# Patient Record
Sex: FEMALE | Race: WHITE | Employment: OTHER | URBAN - METROPOLITAN AREA
[De-identification: names, ages, dates, MRNs, and addresses within clinical notes are randomized per-mention and may not be internally consistent; named-entity substitution may affect disease eponyms.]

---

## 2017-10-25 ENCOUNTER — HOSPITAL ENCOUNTER (EMERGENCY)
Age: 67
Discharge: HOME | End: 2017-10-25
Attending: EMERGENCY MEDICINE
Payer: COMMERCIAL

## 2017-10-25 ENCOUNTER — APPOINTMENT (OUTPATIENT)
Dept: GENERAL RADIOLOGY | Age: 67
End: 2017-10-25
Attending: PHYSICIAN ASSISTANT
Payer: COMMERCIAL

## 2017-10-25 VITALS
DIASTOLIC BLOOD PRESSURE: 72 MMHG | RESPIRATION RATE: 18 BRPM | HEART RATE: 62 BPM | BODY MASS INDEX: 25.18 KG/M2 | HEIGHT: 69 IN | OXYGEN SATURATION: 100 % | SYSTOLIC BLOOD PRESSURE: 138 MMHG | TEMPERATURE: 98.1 F | WEIGHT: 170 LBS

## 2017-10-25 DIAGNOSIS — R07.89 ATYPICAL CHEST PAIN: Primary | ICD-10-CM

## 2017-10-25 DIAGNOSIS — F41.1 ANXIETY STATE: ICD-10-CM

## 2017-10-25 LAB
ALBUMIN SERPL-MCNC: 3.7 G/DL (ref 3.4–5)
ALBUMIN/GLOB SERPL: 1.1 {RATIO} (ref 0.8–1.7)
ALP SERPL-CCNC: 63 U/L (ref 45–117)
ALT SERPL-CCNC: 26 U/L (ref 13–56)
ANION GAP SERPL CALC-SCNC: 10 MMOL/L (ref 3–18)
APPEARANCE UR: CLEAR
AST SERPL-CCNC: 21 U/L (ref 15–37)
BASOPHILS # BLD: 0 K/UL (ref 0–0.06)
BASOPHILS NFR BLD: 1 % (ref 0–2)
BILIRUB SERPL-MCNC: 0.4 MG/DL (ref 0.2–1)
BILIRUB UR QL: NEGATIVE
BUN SERPL-MCNC: 12 MG/DL (ref 7–18)
BUN/CREAT SERPL: 14 (ref 12–20)
CALCIUM SERPL-MCNC: 9.5 MG/DL (ref 8.5–10.1)
CHLORIDE SERPL-SCNC: 103 MMOL/L (ref 100–108)
CK MB CFR SERPL CALC: 2.1 % (ref 0–4)
CK MB CFR SERPL CALC: 2.1 % (ref 0–4)
CK MB SERPL-MCNC: 2.3 NG/ML (ref 5–25)
CK MB SERPL-MCNC: 2.4 NG/ML (ref 5–25)
CK SERPL-CCNC: 108 U/L (ref 26–192)
CK SERPL-CCNC: 115 U/L (ref 26–192)
CO2 SERPL-SCNC: 28 MMOL/L (ref 21–32)
COLOR UR: YELLOW
CREAT SERPL-MCNC: 0.88 MG/DL (ref 0.6–1.3)
DIFFERENTIAL METHOD BLD: ABNORMAL
EOSINOPHIL # BLD: 0 K/UL (ref 0–0.4)
EOSINOPHIL NFR BLD: 1 % (ref 0–5)
ERYTHROCYTE [DISTWIDTH] IN BLOOD BY AUTOMATED COUNT: 13.7 % (ref 11.6–14.5)
GLOBULIN SER CALC-MCNC: 3.5 G/DL (ref 2–4)
GLUCOSE SERPL-MCNC: 107 MG/DL (ref 74–99)
GLUCOSE UR STRIP.AUTO-MCNC: NEGATIVE MG/DL
HCT VFR BLD AUTO: 37.7 % (ref 35–45)
HGB BLD-MCNC: 12.4 G/DL (ref 12–16)
HGB UR QL STRIP: NEGATIVE
KETONES UR QL STRIP.AUTO: 15 MG/DL
LEUKOCYTE ESTERASE UR QL STRIP.AUTO: NEGATIVE
LIPASE SERPL-CCNC: 116 U/L (ref 73–393)
LYMPHOCYTES # BLD: 0.7 K/UL (ref 0.9–3.6)
LYMPHOCYTES NFR BLD: 15 % (ref 21–52)
MCH RBC QN AUTO: 30.2 PG (ref 24–34)
MCHC RBC AUTO-ENTMCNC: 32.9 G/DL (ref 31–37)
MCV RBC AUTO: 91.7 FL (ref 74–97)
MONOCYTES # BLD: 0.4 K/UL (ref 0.05–1.2)
MONOCYTES NFR BLD: 8 % (ref 3–10)
NEUTS SEG # BLD: 3.7 K/UL (ref 1.8–8)
NEUTS SEG NFR BLD: 75 % (ref 40–73)
NITRITE UR QL STRIP.AUTO: NEGATIVE
PH UR STRIP: 5.5 [PH] (ref 5–8)
PLATELET # BLD AUTO: 231 K/UL (ref 135–420)
PMV BLD AUTO: 10.1 FL (ref 9.2–11.8)
POTASSIUM SERPL-SCNC: 3.7 MMOL/L (ref 3.5–5.5)
PROT SERPL-MCNC: 7.2 G/DL (ref 6.4–8.2)
PROT UR STRIP-MCNC: NEGATIVE MG/DL
RBC # BLD AUTO: 4.11 M/UL (ref 4.2–5.3)
SODIUM SERPL-SCNC: 141 MMOL/L (ref 136–145)
SP GR UR REFRACTOMETRY: 1.01 (ref 1–1.03)
TROPONIN I SERPL-MCNC: <0.02 NG/ML (ref 0–0.06)
TROPONIN I SERPL-MCNC: <0.02 NG/ML (ref 0–0.06)
TSH SERPL DL<=0.05 MIU/L-ACNC: 5.74 UIU/ML (ref 0.36–3.74)
UROBILINOGEN UR QL STRIP.AUTO: 0.2 EU/DL (ref 0.2–1)
WBC # BLD AUTO: 4.8 K/UL (ref 4.6–13.2)

## 2017-10-25 PROCEDURE — 81003 URINALYSIS AUTO W/O SCOPE: CPT | Performed by: EMERGENCY MEDICINE

## 2017-10-25 PROCEDURE — 93005 ELECTROCARDIOGRAM TRACING: CPT

## 2017-10-25 PROCEDURE — 82550 ASSAY OF CK (CPK): CPT | Performed by: PHYSICIAN ASSISTANT

## 2017-10-25 PROCEDURE — 84443 ASSAY THYROID STIM HORMONE: CPT | Performed by: PHYSICIAN ASSISTANT

## 2017-10-25 PROCEDURE — 83690 ASSAY OF LIPASE: CPT | Performed by: PHYSICIAN ASSISTANT

## 2017-10-25 PROCEDURE — 80053 COMPREHEN METABOLIC PANEL: CPT | Performed by: PHYSICIAN ASSISTANT

## 2017-10-25 PROCEDURE — 85025 COMPLETE CBC W/AUTO DIFF WBC: CPT | Performed by: PHYSICIAN ASSISTANT

## 2017-10-25 PROCEDURE — 71010 XR CHEST PORT: CPT

## 2017-10-25 PROCEDURE — 99283 EMERGENCY DEPT VISIT LOW MDM: CPT

## 2017-10-25 NOTE — DISCHARGE INSTRUCTIONS
Anxiety Disorder: Care Instructions  Your Care Instructions    Anxiety is a normal reaction to stress. Difficult situations can cause you to have symptoms such as sweaty palms and a nervous feeling. In an anxiety disorder, the symptoms are far more severe. Constant worry, muscle tension, trouble sleeping, nausea and diarrhea, and other symptoms can make normal daily activities difficult or impossible. These symptoms may occur for no reason, and they can affect your work, school, or social life. Medicines, counseling, and self-care can all help. Follow-up care is a key part of your treatment and safety. Be sure to make and go to all appointments, and call your doctor if you are having problems. It's also a good idea to know your test results and keep a list of the medicines you take. How can you care for yourself at home? · Take medicines exactly as directed. Call your doctor if you think you are having a problem with your medicine. · Go to your counseling sessions and follow-up appointments. · Recognize and accept your anxiety. Then, when you are in a situation that makes you anxious, say to yourself, \"This is not an emergency. I feel uncomfortable, but I am not in danger. I can keep going even if I feel anxious. \"  · Be kind to your body:  ¨ Relieve tension with exercise or a massage. ¨ Get enough rest.  ¨ Avoid alcohol, caffeine, nicotine, and illegal drugs. They can increase your anxiety level and cause sleep problems. ¨ Learn and do relaxation techniques. See below for more about these techniques. · Engage your mind. Get out and do something you enjoy. Go to a funny movie, or take a walk or hike. Plan your day. Having too much or too little to do can make you anxious. · Keep a record of your symptoms. Discuss your fears with a good friend or family member, or join a support group for people with similar problems. Talking to others sometimes relieves stress.   · Get involved in social groups, or volunteer to help others. Being alone sometimes makes things seem worse than they are. · Get at least 30 minutes of exercise on most days of the week to relieve stress. Walking is a good choice. You also may want to do other activities, such as running, swimming, cycling, or playing tennis or team sports. Relaxation techniques  Do relaxation exercises 10 to 20 minutes a day. You can play soothing, relaxing music while you do them, if you wish. · Tell others in your house that you are going to do your relaxation exercises. Ask them not to disturb you. · Find a comfortable place, away from all distractions and noise. · Lie down on your back, or sit with your back straight. · Focus on your breathing. Make it slow and steady. · Breathe in through your nose. Breathe out through either your nose or mouth. · Breathe deeply, filling up the area between your navel and your rib cage. Breathe so that your belly goes up and down. · Do not hold your breath. · Breathe like this for 5 to 10 minutes. Notice the feeling of calmness throughout your whole body. As you continue to breathe slowly and deeply, relax by doing the following for another 5 to 10 minutes:  · Tighten and relax each muscle group in your body. You can begin at your toes and work your way up to your head. · Imagine your muscle groups relaxing and becoming heavy. · Empty your mind of all thoughts. · Let yourself relax more and more deeply. · Become aware of the state of calmness that surrounds you. · When your relaxation time is over, you can bring yourself back to alertness by moving your fingers and toes and then your hands and feet and then stretching and moving your entire body. Sometimes people fall asleep during relaxation, but they usually wake up shortly afterward. · Always give yourself time to return to full alertness before you drive a car or do anything that might cause an accident if you are not fully alert.  Never play a relaxation tape while you drive a car. When should you call for help? Call 911 anytime you think you may need emergency care. For example, call if:  ? · You feel you cannot stop from hurting yourself or someone else. ? Keep the numbers for these national suicide hotlines: 2-385-750-TALK (8-998.250.7235) and 3-387-GENUGHW (1-992.499.1900). If you or someone you know talks about suicide or feeling hopeless, get help right away. ? Watch closely for changes in your health, and be sure to contact your doctor if:  ? · You have anxiety or fear that affects your life. ? · You have symptoms of anxiety that are new or different from those you had before. Where can you learn more? Go to http://roxanna-tierra.info/. Enter P754 in the search box to learn more about \"Anxiety Disorder: Care Instructions. \"  Current as of: May 12, 2017  Content Version: 11.4  © 7177-9578 "[employer]. Care instructions adapted under license by [employer] (which disclaims liability or warranty for this information). If you have questions about a medical condition or this instruction, always ask your healthcare professional. [address] any warranty or liability for your use of this information. Chest Pain: Care Instructions  Your Care Instructions    There are many things that can cause chest pain. Some are not serious and will get better on their own in a few days. But some kinds of chest pain need more testing and treatment. Your doctor may have recommended a follow-up visit in the next 8 to 12 hours. If you are not getting better, you may need more tests or treatment. Even though your doctor has released you, you still need to watch for any problems. The doctor carefully checked you, but sometimes problems can develop later. If you have new symptoms or if your symptoms do not get better, get medical care right away.   If you have worse or different chest pain or pressure that lasts more than 5 minutes or you passed out (lost consciousness), call 911 or seek other emergency help right away. A medical visit is only one step in your treatment. Even if you feel better, you still need to do what your doctor recommends, such as going to all suggested follow-up appointments and taking medicines exactly as directed. This will help you recover and help prevent future problems. How can you care for yourself at home? · Rest until you feel better. · Take your medicine exactly as prescribed. Call your doctor if you think you are having a problem with your medicine. · Do not drive after taking a prescription pain medicine. When should you call for help? Call 911 if:  ? · You passed out (lost consciousness). ? · You have severe difficulty breathing. ? · You have symptoms of a heart attack. These may include:  ¨ Chest pain or pressure, or a strange feeling in your chest.  ¨ Sweating. ¨ Shortness of breath. ¨ Nausea or vomiting. ¨ Pain, pressure, or a strange feeling in your back, neck, jaw, or upper belly or in one or both shoulders or arms. ¨ Lightheadedness or sudden weakness. ¨ A fast or irregular heartbeat. After you call 911, the  may tell you to chew 1 adult-strength or 2 to 4 low-dose aspirin. Wait for an ambulance. Do not try to drive yourself. ?Call your doctor today if:  ? · You have any trouble breathing. ? · Your chest pain gets worse. ? · You are dizzy or lightheaded, or you feel like you may faint. ? · You are not getting better as expected. ? · You are having new or different chest pain. Where can you learn more? Go to http://roxanna-tierra.info/. Enter A120 in the search box to learn more about \"Chest Pain: Care Instructions. \"  Current as of: March 20, 2017  Content Version: 11.4  © 4534-6758 [employer].  Care instructions adapted under license by [employer] (which disclaims liability or warranty for this information). If you have questions about a medical condition or this instruction, always ask your healthcare professional. [place] [address] any warranty or liability for your use of this information.

## 2017-10-25 NOTE — ED PROVIDER NOTES
HPI Comments: 11:23 AM     Messi Aranda is a 78 y.o. female with hx of thyroid CA, HLD, and HTN presenting to the ED via EMS C/O \"a couple minute\" episodes of non-radiating, sternal chest pressure starting yesterday. Last episode was 30 minutes ago while she was writing notes. States she is undergoing increased stress due to legal issues, as well as drug problems in her neighborhood. Pt also reports she is becoming increasingly \"paranoid\", stating that her phone has been cloned and she feels like she is being followed. Medications include Wellbutrin, Mirapex, and Synthroid. Reports cigarette cessation 40 years ago. Denies cardiac history or hx of anxiety. Fhx of father with MI at age 67, mother with HTN, and brother with HTN. No SI or HI. Denies illicit drug use or ETOH. Pt denies SOB, chest pain, and any other symptoms or complaints. Written by ALLYN Aguirre, as dictated by Chichi Nash PA-C     The history is provided by the patient. No  was used. Past Medical History:   Diagnosis Date    CAD (coronary artery disease)     thyroid    Hypertension        No past surgical history on file. No family history on file. Social History     Social History    Marital status:      Spouse name: N/A    Number of children: N/A    Years of education: N/A     Occupational History    Not on file. Social History Main Topics    Smoking status: Never Smoker    Smokeless tobacco: Never Used    Alcohol use 1.2 oz/week     2 Glasses of wine per week      Comment: DAILY    Drug use: Not on file    Sexual activity: Not on file     Other Topics Concern    Not on file     Social History Narrative         ALLERGIES: Sulfa (sulfonamide antibiotics)    Review of Systems   Constitutional: Negative for chills and fever. HENT: Negative for congestion. Respiratory: Negative for cough and shortness of breath.     Cardiovascular: Positive for chest pain (chest pressure, denies chest pain). Gastrointestinal: Negative for nausea and vomiting. Musculoskeletal: Negative for arthralgias and joint swelling. Skin: Negative for rash and wound. Neurological: Negative for dizziness, weakness, light-headedness and headaches. Hematological: Negative for adenopathy. Psychiatric/Behavioral: Negative for confusion, sleep disturbance and suicidal ideas. The patient is nervous/anxious. Stress       Vitals:    10/25/17 1156 10/25/17 1720   BP: (!) 147/117 138/72   Pulse: 63 62   Resp: 17 18   Temp: 97.6 °F (36.4 °C) 98.1 °F (36.7 °C)   SpO2: 100% 100%   Weight: 77.1 kg (170 lb)    Height: 5' 9\" (1.753 m)             Physical Exam   Constitutional: She is oriented to person, place, and time. She appears well-developed and well-nourished. No distress.  female in NAD. Alert. Appears anxious. Organizing papers. HENT:   Head: Normocephalic and atraumatic. Right Ear: External ear normal.   Left Ear: External ear normal.   Nose: Nose normal.   Mouth/Throat: Oropharynx is clear and moist.   Eyes: Conjunctivae are normal. Right eye exhibits no discharge. Left eye exhibits no discharge. Neck: Normal range of motion. Cardiovascular: Normal rate, regular rhythm, normal heart sounds and intact distal pulses. Exam reveals no gallop and no friction rub. No murmur heard. Pulmonary/Chest: Effort normal and breath sounds normal. No accessory muscle usage. No tachypnea. No respiratory distress. She has no decreased breath sounds. She has no wheezes. She has no rhonchi. She has no rales. Abdominal: Soft. Musculoskeletal: Normal range of motion. Neurological: She is alert and oriented to person, place, and time. Skin: Skin is warm and dry. No rash noted. She is not diaphoretic. No erythema. Psychiatric: Judgment normal. Her mood appears anxious. Her speech is rapid and/or pressured. Thought content is paranoid (at times).  She expresses no suicidal plans and no homicidal plans. Nursing note and vitals reviewed. RESULTS:    CARDIAC MONITOR NOTE:  Cardiac Rhythm: NSR  Rate: 63 bpm     PULSE OXIMETRY NOTE:  Pulse-ox is 100% on room air  Interpretation: normal       EKG interpretation: (Preliminary)  12:10PM   Sinus bradycardia. Rate 56 bpm. No ST Elevation  EKG read by Adrianoaisha Le PA-C      EKG interpretation: (Secondary)  4:00 PM   Sinus bradycardia. Rate 57 bpm. No ST elevation. No change from previous  EKG read by Adriano JOY Le     XR CHEST PORT   Final Result   Impression:  No radiographic evidence of an acute abnormality. As read by the radiologist.         Labs Reviewed   CBC WITH AUTOMATED DIFF - Abnormal; Notable for the following:        Result Value    RBC 4.11 (*)     NEUTROPHILS 75 (*)     LYMPHOCYTES 15 (*)     ABS.  LYMPHOCYTES 0.7 (*)     All other components within normal limits   METABOLIC PANEL, COMPREHENSIVE - Abnormal; Notable for the following:     Glucose 107 (*)     All other components within normal limits   URINALYSIS W/ RFLX MICROSCOPIC - Abnormal; Notable for the following:     Ketone 15 (*)     All other components within normal limits   LIPASE   TSH 3RD GENERATION   CARDIAC PANEL,(CK, CKMB & TROPONIN)   CARDIAC PANEL,(CK, CKMB & TROPONIN)       Recent Results (from the past 12 hour(s))   EKG, 12 LEAD, INITIAL    Collection Time: 10/25/17 12:10 PM   Result Value Ref Range    Ventricular Rate 56 BPM    Atrial Rate 56 BPM    P-R Interval 184 ms    QRS Duration 86 ms    Q-T Interval 450 ms    QTC Calculation (Bezet) 434 ms    Calculated P Axis 60 degrees    Calculated R Axis 73 degrees    Calculated T Axis 67 degrees    Diagnosis       Sinus bradycardia  Cannot rule out Anterior infarct , age undetermined  Abnormal ECG  No previous ECGs available     CBC WITH AUTOMATED DIFF    Collection Time: 10/25/17 12:17 PM   Result Value Ref Range    WBC 4.8 4.6 - 13.2 K/uL    RBC 4.11 (L) 4.20 - 5.30 M/uL    HGB 12.4 12.0 - 16.0 g/dL    HCT 37.7 35.0 - 45.0 %    MCV 91.7 74.0 - 97.0 FL    MCH 30.2 24.0 - 34.0 PG    MCHC 32.9 31.0 - 37.0 g/dL    RDW 13.7 11.6 - 14.5 %    PLATELET 973 356 - 077 K/uL    MPV 10.1 9.2 - 11.8 FL    NEUTROPHILS 75 (H) 40 - 73 %    LYMPHOCYTES 15 (L) 21 - 52 %    MONOCYTES 8 3 - 10 %    EOSINOPHILS 1 0 - 5 %    BASOPHILS 1 0 - 2 %    ABS. NEUTROPHILS 3.7 1.8 - 8.0 K/UL    ABS. LYMPHOCYTES 0.7 (L) 0.9 - 3.6 K/UL    ABS. MONOCYTES 0.4 0.05 - 1.2 K/UL    ABS. EOSINOPHILS 0.0 0.0 - 0.4 K/UL    ABS. BASOPHILS 0.0 0.0 - 0.06 K/UL    DF AUTOMATED     METABOLIC PANEL, COMPREHENSIVE    Collection Time: 10/25/17 12:17 PM   Result Value Ref Range    Sodium 141 136 - 145 mmol/L    Potassium 3.7 3.5 - 5.5 mmol/L    Chloride 103 100 - 108 mmol/L    CO2 28 21 - 32 mmol/L    Anion gap 10 3.0 - 18 mmol/L    Glucose 107 (H) 74 - 99 mg/dL    BUN 12 7.0 - 18 MG/DL    Creatinine 0.88 0.6 - 1.3 MG/DL    BUN/Creatinine ratio 14 12 - 20      GFR est AA >60 >60 ml/min/1.73m2    GFR est non-AA >60 >60 ml/min/1.73m2    Calcium 9.5 8.5 - 10.1 MG/DL    Bilirubin, total 0.4 0.2 - 1.0 MG/DL    ALT (SGPT) 26 13 - 56 U/L    AST (SGOT) 21 15 - 37 U/L    Alk.  phosphatase 63 45 - 117 U/L    Protein, total 7.2 6.4 - 8.2 g/dL    Albumin 3.7 3.4 - 5.0 g/dL    Globulin 3.5 2.0 - 4.0 g/dL    A-G Ratio 1.1 0.8 - 1.7     LIPASE    Collection Time: 10/25/17 12:17 PM   Result Value Ref Range    Lipase 116 73 - 393 U/L   URINALYSIS W/ RFLX MICROSCOPIC    Collection Time: 10/25/17 12:17 PM   Result Value Ref Range    Color YELLOW      Appearance CLEAR      Specific gravity 1.012 1.005 - 1.030      pH (UA) 5.5 5.0 - 8.0      Protein NEGATIVE  NEG mg/dL    Glucose NEGATIVE  NEG mg/dL    Ketone 15 (A) NEG mg/dL    Bilirubin NEGATIVE  NEG      Blood NEGATIVE  NEG      Urobilinogen 0.2 0.2 - 1.0 EU/dL    Nitrites NEGATIVE  NEG      Leukocyte Esterase NEGATIVE  NEG     CARDIAC PANEL,(CK, CKMB & TROPONIN)    Collection Time: 10/25/17 12:17 PM   Result Value Ref Range     26 - 192 U/L    CK - MB 2.4 <3.6 ng/ml    CK-MB Index 2.1 0.0 - 4.0 %    Troponin-I, Qt. <0.02 0.00 - 0.06 NG/ML   EKG, 12 LEAD, SUBSEQUENT    Collection Time: 10/25/17  4:00 PM   Result Value Ref Range    Ventricular Rate 57 BPM    Atrial Rate 57 BPM    P-R Interval 198 ms    QRS Duration 86 ms    Q-T Interval 438 ms    QTC Calculation (Bezet) 426 ms    Calculated P Axis 48 degrees    Calculated R Axis 80 degrees    Calculated T Axis 78 degrees    Diagnosis       Sinus bradycardia  Otherwise normal ECG  When compared with ECG of 25-OCT-2017 12:10,  No significant change was found     CARDIAC PANEL,(CK, CKMB & TROPONIN)    Collection Time: 10/25/17  4:05 PM   Result Value Ref Range     26 - 192 U/L    CK - MB 2.3 <3.6 ng/ml    CK-MB Index 2.1 0.0 - 4.0 %    Troponin-I, Qt. <0.02 0.00 - 0.06 NG/ML     MDM  Number of Diagnoses or Management Options  Anxiety state:   Atypical chest pain:   Diagnosis management comments: ACS/MI, arrhythmia, pericarditis, myocarditis, CHF, COPD, PTX, PNA, bronchitis, chest wall pain, GERD. Doubt PE       Amount and/or Complexity of Data Reviewed  Clinical lab tests: reviewed and ordered  Tests in the radiology section of CPT®: reviewed and ordered (Chest X-ray)  Tests in the medicine section of CPT®: ordered and reviewed (EKG)  Independent visualization of images, tracings, or specimens: yes (Chest X-ray, EKG)      ED Course     MEDICATIONS GIVEN:  Medications - No data to display     Procedures    PROGRESS NOTE:  11:23 AM  Initial assessment performed. Written by Fiona Marquez ED Scribe, as dictated by Laurel Adkins PA-C     PROGRESS NOTE:   1:03 PM  Patient is chest pain free. She exhibits more paranoia about individual hacking her phone. She does deny SI or HI and seems confused by this question. She denies any CAD hx, however it is documented in her chart. She has never had stress test, but has a family doctor.  If work up is negative, will likely follow up for outpatient stress test if she remains chest pain free. Written by Joel Fuentes, ED Scribe, as dictated by Brandon Gutierrez PA-C. PROGRESS NOTE:   3:05 PM  Patient remains chest pain free. Awaiting serial EKG and cardiac enzymes. Written by Joel Fuentes, ED Scribe, as dictated by Brandon Gutierrez PA-C. Cardiac workup unremarkable. Doubt ACS. Not c/w PE. Low HEART score risk as calculated at 3. Outpatient FU for cardiac stress. Reasons to RTED discussed with pt. All questions answered. Pt feels comfortable going home at this time. Pt expressed understanding and she agrees with plan. DISCHARGE NOTE:  5:20 PM   Randa Salmon's  results have been reviewed with her. She has been counseled regarding her diagnosis, treatment, and plan. She verbally conveys understanding and agreement of the signs, symptoms, diagnosis, treatment and prognosis and additionally agrees to follow up as discussed. She also agrees with the care-plan and conveys that all of her questions have been answered. I have also provided discharge instructions for her that include: educational information regarding their diagnosis and treatment, and list of reasons why they would want to return to the ED prior to their follow-up appointment, should her condition change. The patient and/or family has been provided with education for proper Emergency Department utilization. CLINICAL IMPRESSION:    1. Atypical chest pain    2.  Anxiety state        PLAN: DISCHARGE HOME    Follow-up Information     Follow up With Details Comments Contact Info    Juany Black MD Schedule an appointment as soon as possible for a visit in 2 days For primary care follow up [place] [address] [facility] EMERGENCY DEPT  As needed, If symptoms worsen [address]  [phone]          Current Discharge Medication List          ATTESTATIONS:  This note is prepared by [employer] Chevy, acting as Scribe for [employer]. CREAT, JOY: The scribe's documentation has been prepared under my direction and personally reviewed by me in its entirety. I confirm that the note above accurately reflects all work, treatment, procedures, and medical decision making performed by me.

## 2017-10-25 NOTE — ED TRIAGE NOTES
Patient arrived to ER via EMS for anxiety with intermittent chest discomfort that started x 1 days. Upon arrival to ER, symptoms had resolved. Denies pain. No acute distress noted. Ambulated to bathroom to void.

## 2017-10-26 LAB
ATRIAL RATE: 56 BPM
ATRIAL RATE: 57 BPM
CALCULATED P AXIS, ECG09: 48 DEGREES
CALCULATED P AXIS, ECG09: 60 DEGREES
CALCULATED R AXIS, ECG10: 73 DEGREES
CALCULATED R AXIS, ECG10: 80 DEGREES
CALCULATED T AXIS, ECG11: 67 DEGREES
CALCULATED T AXIS, ECG11: 78 DEGREES
DIAGNOSIS, 93000: NORMAL
DIAGNOSIS, 93000: NORMAL
P-R INTERVAL, ECG05: 184 MS
P-R INTERVAL, ECG05: 198 MS
Q-T INTERVAL, ECG07: 438 MS
Q-T INTERVAL, ECG07: 450 MS
QRS DURATION, ECG06: 86 MS
QRS DURATION, ECG06: 86 MS
QTC CALCULATION (BEZET), ECG08: 426 MS
QTC CALCULATION (BEZET), ECG08: 434 MS
VENTRICULAR RATE, ECG03: 56 BPM
VENTRICULAR RATE, ECG03: 57 BPM

## 2017-10-26 NOTE — CALL BACK NOTE
TSH 3RD GENERATION   Status:  Final result   Visible to patient:  No (Not Released) Order: [id number]          Ref Range & Units 1d ago       TSH 0.36 - 3.74 uIU/mL 5.74 (H)     Resulting Agency  [facility]          Specimen Collected: 10/25/17 12:17 PM Last Resulted: 10/25/17  8:03 PM               Attempted to call patient with results of TSH.  There was no answer, message left to return call to Emergency Department for results

## 2017-10-27 NOTE — CALL BACK NOTE
Attempted to call patient with TSH results. Patient did not answer, message left to return call to the ED for results.       TSH 3RD GENERATION   Status:  Final result   Visible to patient:  No (Not Released) Order: [id number]          Ref Range & Units 2d ago       TSH 0.36 - 3.74 uIU/mL 5.74 (H)     Resulting Agency  [id number]          Specimen Collected: 10/25/17 12:17 PM Last Resulted: 10/25/17  8:03 PM

## 2019-11-13 ENCOUNTER — APPOINTMENT (OUTPATIENT)
Dept: CT IMAGING | Age: 69
DRG: 064 | End: 2019-11-13
Attending: EMERGENCY MEDICINE
Payer: MEDICARE

## 2019-11-13 ENCOUNTER — APPOINTMENT (OUTPATIENT)
Dept: GENERAL RADIOLOGY | Age: 69
DRG: 064 | End: 2019-11-13
Attending: EMERGENCY MEDICINE
Payer: MEDICARE

## 2019-11-13 ENCOUNTER — HOSPITAL ENCOUNTER (INPATIENT)
Age: 69
LOS: 8 days | Discharge: NURSING, REHAB OR LONG-TERM CARE | DRG: 064 | End: 2019-11-21
Attending: EMERGENCY MEDICINE | Admitting: FAMILY MEDICINE
Payer: MEDICARE

## 2019-11-13 DIAGNOSIS — N17.9 AKI (ACUTE KIDNEY INJURY) (HCC): ICD-10-CM

## 2019-11-13 DIAGNOSIS — R77.8 ELEVATED TROPONIN: ICD-10-CM

## 2019-11-13 DIAGNOSIS — R41.82 ALTERED MENTAL STATUS, UNSPECIFIED ALTERED MENTAL STATUS TYPE: Primary | ICD-10-CM

## 2019-11-13 DIAGNOSIS — R00.1 SINUS BRADYCARDIA: ICD-10-CM

## 2019-11-13 DIAGNOSIS — R53.1 GENERALIZED WEAKNESS: ICD-10-CM

## 2019-11-13 LAB
ALBUMIN SERPL-MCNC: 4.4 G/DL (ref 3.4–5)
ALBUMIN/GLOB SERPL: 1.4 {RATIO} (ref 0.8–1.7)
ALP SERPL-CCNC: 57 U/L (ref 45–117)
ALT SERPL-CCNC: 24 U/L (ref 13–56)
AMMONIA PLAS-SCNC: <10 UMOL/L (ref 11–32)
AMPHET UR QL SCN: NEGATIVE
ANION GAP SERPL CALC-SCNC: 13 MMOL/L (ref 3–18)
APAP SERPL-MCNC: <2 UG/ML (ref 10–30)
APPEARANCE UR: CLEAR
AST SERPL-CCNC: 30 U/L (ref 10–38)
ATRIAL RATE: 53 BPM
BACTERIA URNS QL MICRO: ABNORMAL /HPF
BARBITURATES UR QL SCN: NEGATIVE
BASOPHILS # BLD: 0 K/UL (ref 0–0.1)
BASOPHILS NFR BLD: 0 % (ref 0–2)
BENZODIAZ UR QL: NEGATIVE
BILIRUB SERPL-MCNC: 1 MG/DL (ref 0.2–1)
BILIRUB UR QL: NEGATIVE
BUN SERPL-MCNC: 57 MG/DL (ref 7–18)
BUN/CREAT SERPL: 35 (ref 12–20)
CALCIUM SERPL-MCNC: 11.2 MG/DL (ref 8.5–10.1)
CALCULATED P AXIS, ECG09: 90 DEGREES
CALCULATED R AXIS, ECG10: 86 DEGREES
CALCULATED T AXIS, ECG11: 98 DEGREES
CANNABINOIDS UR QL SCN: NEGATIVE
CHLORIDE SERPL-SCNC: 94 MMOL/L (ref 100–111)
CK MB CFR SERPL CALC: 3.6 % (ref 0–4)
CK MB CFR SERPL CALC: 4 % (ref 0–4)
CK MB CFR SERPL CALC: 4 % (ref 0–4)
CK MB CFR SERPL CALC: 4.4 % (ref 0–4)
CK MB SERPL-MCNC: 10.6 NG/ML (ref 5–25)
CK MB SERPL-MCNC: 8.1 NG/ML (ref 5–25)
CK MB SERPL-MCNC: 8.7 NG/ML (ref 5–25)
CK MB SERPL-MCNC: 8.8 NG/ML (ref 5–25)
CK SERPL-CCNC: 186 U/L (ref 26–192)
CK SERPL-CCNC: 217 U/L (ref 26–192)
CK SERPL-CCNC: 243 U/L (ref 26–192)
CK SERPL-CCNC: 265 U/L (ref 26–192)
CO2 SERPL-SCNC: 30 MMOL/L (ref 21–32)
COCAINE UR QL SCN: NEGATIVE
COLOR UR: ABNORMAL
CREAT SERPL-MCNC: 1.62 MG/DL (ref 0.6–1.3)
DIAGNOSIS, 93000: NORMAL
DIFFERENTIAL METHOD BLD: ABNORMAL
EOSINOPHIL # BLD: 0.1 K/UL (ref 0–0.4)
EOSINOPHIL NFR BLD: 2 % (ref 0–5)
EPITH CASTS URNS QL MICRO: ABNORMAL /LPF (ref 0–5)
ERYTHROCYTE [DISTWIDTH] IN BLOOD BY AUTOMATED COUNT: 13.2 % (ref 11.6–14.5)
ETHANOL SERPL-MCNC: <3 MG/DL (ref 0–3)
GLOBULIN SER CALC-MCNC: 3.1 G/DL (ref 2–4)
GLUCOSE BLD STRIP.AUTO-MCNC: 105 MG/DL (ref 70–110)
GLUCOSE SERPL-MCNC: 121 MG/DL (ref 74–99)
GLUCOSE UR STRIP.AUTO-MCNC: 250 MG/DL
HCT VFR BLD AUTO: 43 % (ref 35–45)
HDSCOM,HDSCOM: NORMAL
HGB BLD-MCNC: 14.7 G/DL (ref 12–16)
HGB UR QL STRIP: NEGATIVE
KETONES UR QL STRIP.AUTO: 15 MG/DL
LEUKOCYTE ESTERASE UR QL STRIP.AUTO: NEGATIVE
LYMPHOCYTES # BLD: 0.5 K/UL (ref 0.9–3.6)
LYMPHOCYTES NFR BLD: 6 % (ref 21–52)
MAGNESIUM SERPL-MCNC: 2 MG/DL (ref 1.6–2.6)
MCH RBC QN AUTO: 30.9 PG (ref 24–34)
MCHC RBC AUTO-ENTMCNC: 34.2 G/DL (ref 31–37)
MCV RBC AUTO: 90.3 FL (ref 74–97)
METHADONE UR QL: NEGATIVE
MONOCYTES # BLD: 0.6 K/UL (ref 0.05–1.2)
MONOCYTES NFR BLD: 8 % (ref 3–10)
NEUTS SEG # BLD: 6.4 K/UL (ref 1.8–8)
NEUTS SEG NFR BLD: 84 % (ref 40–73)
NITRITE UR QL STRIP.AUTO: NEGATIVE
OPIATES UR QL: NEGATIVE
P-R INTERVAL, ECG05: 192 MS
PCP UR QL: NEGATIVE
PH UR STRIP: 5.5 [PH] (ref 5–8)
PLATELET # BLD AUTO: 211 K/UL (ref 135–420)
PMV BLD AUTO: 10.6 FL (ref 9.2–11.8)
POTASSIUM SERPL-SCNC: 3.4 MMOL/L (ref 3.5–5.5)
PROT SERPL-MCNC: 7.5 G/DL (ref 6.4–8.2)
PROT UR STRIP-MCNC: 30 MG/DL
Q-T INTERVAL, ECG07: 514 MS
QRS DURATION, ECG06: 86 MS
QTC CALCULATION (BEZET), ECG08: 482 MS
RBC # BLD AUTO: 4.76 M/UL (ref 4.2–5.3)
RBC #/AREA URNS HPF: ABNORMAL /HPF (ref 0–5)
SALICYLATES SERPL-MCNC: <1.7 MG/DL (ref 2.8–20)
SODIUM SERPL-SCNC: 137 MMOL/L (ref 136–145)
SP GR UR REFRACTOMETRY: 1.02 (ref 1–1.03)
T3FREE SERPL-MCNC: 1 PG/ML (ref 2.18–3.98)
T4 FREE SERPL-MCNC: 1 NG/DL (ref 0.7–1.5)
TROPONIN I SERPL-MCNC: 0.05 NG/ML (ref 0–0.04)
TROPONIN I SERPL-MCNC: 0.06 NG/ML (ref 0–0.04)
TSH SERPL DL<=0.05 MIU/L-ACNC: 15.5 UIU/ML (ref 0.36–3.74)
UROBILINOGEN UR QL STRIP.AUTO: 1 EU/DL (ref 0.2–1)
VENTRICULAR RATE, ECG03: 53 BPM
WBC # BLD AUTO: 7.6 K/UL (ref 4.6–13.2)
WBC URNS QL MICRO: ABNORMAL /HPF (ref 0–5)

## 2019-11-13 PROCEDURE — 74011250637 HC RX REV CODE- 250/637: Performed by: EMERGENCY MEDICINE

## 2019-11-13 PROCEDURE — 72170 X-RAY EXAM OF PELVIS: CPT

## 2019-11-13 PROCEDURE — 99285 EMERGENCY DEPT VISIT HI MDM: CPT

## 2019-11-13 PROCEDURE — 74011250636 HC RX REV CODE- 250/636: Performed by: FAMILY MEDICINE

## 2019-11-13 PROCEDURE — 36415 COLL VENOUS BLD VENIPUNCTURE: CPT

## 2019-11-13 PROCEDURE — 84443 ASSAY THYROID STIM HORMONE: CPT

## 2019-11-13 PROCEDURE — 65660000000 HC RM CCU STEPDOWN

## 2019-11-13 PROCEDURE — 80053 COMPREHEN METABOLIC PANEL: CPT

## 2019-11-13 PROCEDURE — 74011250637 HC RX REV CODE- 250/637: Performed by: FAMILY MEDICINE

## 2019-11-13 PROCEDURE — 82962 GLUCOSE BLOOD TEST: CPT

## 2019-11-13 PROCEDURE — 70450 CT HEAD/BRAIN W/O DYE: CPT

## 2019-11-13 PROCEDURE — 74011000250 HC RX REV CODE- 250: Performed by: FAMILY MEDICINE

## 2019-11-13 PROCEDURE — 93005 ELECTROCARDIOGRAM TRACING: CPT

## 2019-11-13 PROCEDURE — 80307 DRUG TEST PRSMV CHEM ANLYZR: CPT

## 2019-11-13 PROCEDURE — 82140 ASSAY OF AMMONIA: CPT

## 2019-11-13 PROCEDURE — 96365 THER/PROPH/DIAG IV INF INIT: CPT

## 2019-11-13 PROCEDURE — 84439 ASSAY OF FREE THYROXINE: CPT

## 2019-11-13 PROCEDURE — 85025 COMPLETE CBC W/AUTO DIFF WBC: CPT

## 2019-11-13 PROCEDURE — 83735 ASSAY OF MAGNESIUM: CPT

## 2019-11-13 PROCEDURE — 84481 FREE ASSAY (FT-3): CPT

## 2019-11-13 PROCEDURE — 74011250636 HC RX REV CODE- 250/636: Performed by: EMERGENCY MEDICINE

## 2019-11-13 PROCEDURE — 81001 URINALYSIS AUTO W/SCOPE: CPT

## 2019-11-13 PROCEDURE — 71045 X-RAY EXAM CHEST 1 VIEW: CPT

## 2019-11-13 PROCEDURE — 82550 ASSAY OF CK (CPK): CPT

## 2019-11-13 RX ORDER — ACETAMINOPHEN 325 MG/1
650 TABLET ORAL
Status: DISCONTINUED | OUTPATIENT
Start: 2019-11-13 | End: 2019-11-21 | Stop reason: HOSPADM

## 2019-11-13 RX ORDER — PRAMIPEXOLE DIHYDROCHLORIDE 0.25 MG/1
1 TABLET ORAL 3 TIMES DAILY
Status: DISCONTINUED | OUTPATIENT
Start: 2019-11-13 | End: 2019-11-16

## 2019-11-13 RX ORDER — POTASSIUM CHLORIDE 7.45 MG/ML
10 INJECTION INTRAVENOUS
Status: COMPLETED | OUTPATIENT
Start: 2019-11-13 | End: 2019-11-13

## 2019-11-13 RX ORDER — LEVOTHYROXINE SODIUM 75 UG/1
112 TABLET ORAL
Status: DISCONTINUED | OUTPATIENT
Start: 2019-11-14 | End: 2019-11-13

## 2019-11-13 RX ORDER — SODIUM CHLORIDE 0.9 % (FLUSH) 0.9 %
5-40 SYRINGE (ML) INJECTION AS NEEDED
Status: DISCONTINUED | OUTPATIENT
Start: 2019-11-13 | End: 2019-11-21 | Stop reason: HOSPADM

## 2019-11-13 RX ORDER — LEVOTHYROXINE SODIUM 75 UG/1
112 TABLET ORAL ONCE
Status: COMPLETED | OUTPATIENT
Start: 2019-11-13 | End: 2019-11-13

## 2019-11-13 RX ORDER — SODIUM CHLORIDE 0.9 % (FLUSH) 0.9 %
5-40 SYRINGE (ML) INJECTION EVERY 8 HOURS
Status: DISCONTINUED | OUTPATIENT
Start: 2019-11-13 | End: 2019-11-21 | Stop reason: HOSPADM

## 2019-11-13 RX ORDER — SODIUM CHLORIDE 9 MG/ML
75 INJECTION, SOLUTION INTRAVENOUS CONTINUOUS
Status: DISCONTINUED | OUTPATIENT
Start: 2019-11-13 | End: 2019-11-21 | Stop reason: HOSPADM

## 2019-11-13 RX ORDER — BUPROPION HYDROCHLORIDE 150 MG/1
300 TABLET ORAL
Status: DISCONTINUED | OUTPATIENT
Start: 2019-11-14 | End: 2019-11-21 | Stop reason: HOSPADM

## 2019-11-13 RX ORDER — LORAZEPAM 1 MG/1
2 TABLET ORAL
Status: DISCONTINUED | OUTPATIENT
Start: 2019-11-13 | End: 2019-11-21 | Stop reason: HOSPADM

## 2019-11-13 RX ORDER — BUPROPION HYDROCHLORIDE 100 MG/1
100 TABLET, EXTENDED RELEASE ORAL DAILY
Status: DISCONTINUED | OUTPATIENT
Start: 2019-11-14 | End: 2019-11-13 | Stop reason: ALTCHOICE

## 2019-11-13 RX ORDER — LORAZEPAM 1 MG/1
1 TABLET ORAL
Status: DISCONTINUED | OUTPATIENT
Start: 2019-11-13 | End: 2019-11-21 | Stop reason: HOSPADM

## 2019-11-13 RX ORDER — HYDROCODONE BITARTRATE AND ACETAMINOPHEN 5; 325 MG/1; MG/1
1 TABLET ORAL
Status: DISCONTINUED | OUTPATIENT
Start: 2019-11-13 | End: 2019-11-21 | Stop reason: HOSPADM

## 2019-11-13 RX ORDER — NALOXONE HYDROCHLORIDE 0.4 MG/ML
0.4 INJECTION, SOLUTION INTRAMUSCULAR; INTRAVENOUS; SUBCUTANEOUS AS NEEDED
Status: DISCONTINUED | OUTPATIENT
Start: 2019-11-13 | End: 2019-11-21 | Stop reason: HOSPADM

## 2019-11-13 RX ORDER — HEPARIN SODIUM 5000 [USP'U]/ML
5000 INJECTION, SOLUTION INTRAVENOUS; SUBCUTANEOUS EVERY 8 HOURS
Status: DISCONTINUED | OUTPATIENT
Start: 2019-11-13 | End: 2019-11-21 | Stop reason: HOSPADM

## 2019-11-13 RX ORDER — LORAZEPAM 2 MG/ML
1 INJECTION INTRAMUSCULAR
Status: DISCONTINUED | OUTPATIENT
Start: 2019-11-13 | End: 2019-11-21 | Stop reason: HOSPADM

## 2019-11-13 RX ORDER — SODIUM CHLORIDE 9 MG/ML
100 INJECTION, SOLUTION INTRAVENOUS ONCE
Status: DISCONTINUED | OUTPATIENT
Start: 2019-11-13 | End: 2019-11-13 | Stop reason: SDUPTHER

## 2019-11-13 RX ORDER — LORAZEPAM 2 MG/ML
3 INJECTION INTRAMUSCULAR
Status: DISCONTINUED | OUTPATIENT
Start: 2019-11-13 | End: 2019-11-21 | Stop reason: HOSPADM

## 2019-11-13 RX ORDER — LORAZEPAM 2 MG/ML
2 INJECTION INTRAMUSCULAR
Status: DISCONTINUED | OUTPATIENT
Start: 2019-11-13 | End: 2019-11-21 | Stop reason: HOSPADM

## 2019-11-13 RX ADMIN — Medication 10 ML: at 21:00

## 2019-11-13 RX ADMIN — HEPARIN SODIUM 5000 UNITS: 5000 INJECTION INTRAVENOUS; SUBCUTANEOUS at 17:56

## 2019-11-13 RX ADMIN — FOLIC ACID: 5 INJECTION, SOLUTION INTRAMUSCULAR; INTRAVENOUS; SUBCUTANEOUS at 20:41

## 2019-11-13 RX ADMIN — POTASSIUM CHLORIDE 10 MEQ: 7.46 INJECTION, SOLUTION INTRAVENOUS at 16:34

## 2019-11-13 RX ADMIN — PRAMIPEXOLE DIHYDROCHLORIDE 1 MG: 0.25 TABLET ORAL at 17:55

## 2019-11-13 RX ADMIN — SODIUM CHLORIDE 1000 ML: 900 INJECTION, SOLUTION INTRAVENOUS at 13:41

## 2019-11-13 RX ADMIN — SODIUM CHLORIDE 100 ML/HR: 900 INJECTION, SOLUTION INTRAVENOUS at 15:56

## 2019-11-13 RX ADMIN — LEVOTHYROXINE SODIUM 112 MCG: 75 TABLET ORAL at 17:55

## 2019-11-13 RX ADMIN — HEPARIN SODIUM 5000 UNITS: 5000 INJECTION INTRAVENOUS; SUBCUTANEOUS at 22:59

## 2019-11-13 RX ADMIN — POTASSIUM CHLORIDE 10 MEQ: 7.46 INJECTION, SOLUTION INTRAVENOUS at 15:08

## 2019-11-13 RX ADMIN — POTASSIUM CHLORIDE 10 MEQ: 7.46 INJECTION, SOLUTION INTRAVENOUS at 13:42

## 2019-11-13 NOTE — H&P
History & Physical    Patient: Sahra Estevez MRN: [id number]  CSN: [id number]    YOB: 1950  Age: 77 y.o. Sex: female      DOA: 11/13/2019  Primary Care Provider:  Claudell Moles, MD      Assessment/Plan     Patient Active Problem List   Diagnosis Code    Altered mental status R41.82    Sinus bradycardia R00.1    EMILI (acute kidney injury) ([facility] [address] N17.9    Generalized weakness R53.1    Elevated troponin R79.89     76 y/o HTN, Hypothyroidism presented after being found down on the floor for undetermined amount of time. Admitted for:    Metabolic encephalopathy  Acute kidney injury  Sinus bradycardia  Generalized weakness  Elevated troponin  Mild hypokalemia    Head CT negative  Acetaminophen and salicylate levels are within normal limits  Serum alcohol level within normal limits  Will obtain urine drug screen and ammonia level  Urinalysis negative  Creatinine 1.62, last known creatinine was October 25, 2017 and was 0.88  Will rehydrate overnight and recheck BMP in a.m. Dr. Ronna Prince consulted for bradycardia and elevated troponin: Recommended that cardiac enzymes be trended, no atropine to be given while blood pressure is within normal limits with today's, likely cardiac strain secondary to EMILI and dehydration  Levothyroxine 100 mcg was given in ED, will order 175mcg dose recently filled at pharmacy per Pharmacy Dept  Monitor on telemetry unit  IV fluid rehydration  TSH elevated at 5.74  We will add alcohol withdrawal protocol  Potassium level 3.1, replete and recheck in a.m. Basic PT/OT consults, may need admission to skilled care facility if generalized weakness does not improve     Estimated length of stay : 3 days    Sister (who was at the bedside) Yolande Rodriguez, [phone]  Brother (lives in [place]) [address] [place] [address], [phone]    CC:  [facility]       HPI:     Sahra Estevez is a 77 y.o. female Sahra Estevez is a 77 y.o. female with PMHX of alcohol use, thyroid dysfunction, hypertension, heart disease who presents to the emergency department C/O altered mental status. Per EMS report they were called for a welfare check as the patient's contacted her brother who lives in [place] and he called the police to do a welfare check because no one had seen the patient in over a week. The patient's brother called the patient's sister who is at the bedside now and she met the police at her sister's house. Patient sister stated that she had not heard from her sister and approximately 2 weeks except for weird message asking for her to text her. Upon their arrival they found the patient lying on the floor and confused. They states she was oriented to self and place. The sister had noted that she does not normally have changes in mental status. They deny any obvious physical injuries. State the patient was slightly bradycardic with heart rate in the mid 50s with the remainder of her vital signs being normal.  Patient states she is unsure how long she has been on the floor. She does admit to some intermittent mild blurry vision. She denies any chest pain, shortness of breath, abdominal pain, nausea, vomiting, headache, back pain, hip pain. She states she has not been taking her medicines as she has not been given a specific regimen. Sister states that the patient had been complaining of excessive fatigue and dizziness several weeks ago. She is also concerned that her sister may be drinking alcohol excessively and not eating. Past Medical History:   Diagnosis Date    CAD (coronary artery disease)     thyroid    Hypertension        No past surgical history on file. No family history on file.     Social History     Socioeconomic History    Marital status:      Spouse name: Not on file    Number of children: Not on file    Years of education: Not on file    Highest education level: Not on file   Tobacco Use    Smoking status: Never Smoker    Smokeless tobacco: Never Used Substance and Sexual Activity    Alcohol use: Yes     Alcohol/week: 2.0 standard drinks     Types: 2 Glasses of wine per week     Comment: DAILY       Prior to Admission medications    Medication Sig Start Date End Date Taking? Authorizing Provider   valsartan-hydrochlorothiazide (DIOVAN-HCT) 80-12.5 mg per tablet Take 1 Tab by mouth daily. Malika Ellis MD   pramipexole (MIRAPEX) 1 mg tablet Take 1 mg by mouth three (3) times daily. Malika Ellis MD   levothyroxine (SYNTHROID) 112 mcg tablet Take  by mouth Daily (before breakfast). Malika Ellis MD   ergocalciferol (ERGOCALCIFEROL) 50,000 unit capsule Take 50,000 Units by mouth. Malika Ellis MD   buPROPion SR (WELLBUTRIN SR) 100 mg SR tablet Take 100 mg by mouth. Malika Ellis MD   naproxen (NAPROSYN) 375 mg tablet Take 1 Tab by mouth two (2) times daily (with meals). 16   Melvina Marshall MD       Allergies   Allergen Reactions    Sulfa (Sulfonamide Antibiotics) Hives       Review of Systems: Limited review of systems as patient is very slow and delayed in answering questions and looks very groggy        Physical Exam:     Physical Exam:  Visit Vitals  /75   Pulse 81   Temp 97.7 °F (36.5 °C)   Resp 12   Ht 5' 8\" (1.727 m)   Wt 58.1 kg (128 lb)   SpO2 100%   BMI 19.46 kg/m²      O2 Device: Room air    Temp (24hrs), Av.7 °F (36.5 °C), Min:97.7 °F (36.5 °C), Max:97.7 °F (36.5 °C)    No intake/output data recorded. No intake/output data recorded. General:  Awake, but sleepy, cooperative, no distress. Head:  Normocephalic, without obvious abnormality, atraumatic. Eyes:  Conjunctivae/corneas clear, sclera anicteric, PERRL, EOMs intact. Nose: Nares normal. No drainage or sinus tenderness. Throat: Lips, mucosa, and tongue normal.    Neck: Supple, symmetrical, trachea midline, no adenopathy. Lungs:   Clear to auscultation bilaterally. Heart:  Regular rate and rhythm, S1, S2 normal, no murmur, click, rub or gallop. Abdomen: Soft, non-tender. Bowel sounds normal. No masses,  No organomegaly. Extremities: Extremities normal, atraumatic, no cyanosis or edema. Capillary refill normal.   Pulses: 2+ and symmetric all extremities. Skin: Skin color pink but occasional mottling, turgor normal. No rashes or lesions   Neurologic: CNII-XII intact. No focal motor or sensory deficit, generally delayed in speech and movement       Labs Reviewed:  Recent Results (from the past 24 hour(s))   CBC WITH AUTOMATED DIFF    Collection Time: 11/13/19 12:10 PM   Result Value Ref Range    WBC 7.6 4.6 - 13.2 K/uL    RBC 4.76 4.20 - 5.30 M/uL    HGB 14.7 12.0 - 16.0 g/dL    HCT 43.0 35.0 - 45.0 %    MCV 90.3 74.0 - 97.0 FL    MCH 30.9 24.0 - 34.0 PG    MCHC 34.2 31.0 - 37.0 g/dL    RDW 13.2 11.6 - 14.5 %    PLATELET 381 442 - 329 K/uL    MPV 10.6 9.2 - 11.8 FL    NEUTROPHILS 84 (H) 40 - 73 %    LYMPHOCYTES 6 (L) 21 - 52 %    MONOCYTES 8 3 - 10 %    EOSINOPHILS 2 0 - 5 %    BASOPHILS 0 0 - 2 %    ABS. NEUTROPHILS 6.4 1.8 - 8.0 K/UL    ABS. LYMPHOCYTES 0.5 (L) 0.9 - 3.6 K/UL    ABS. MONOCYTES 0.6 0.05 - 1.2 K/UL    ABS. EOSINOPHILS 0.1 0.0 - 0.4 K/UL    ABS. BASOPHILS 0.0 0.0 - 0.1 K/UL    DF AUTOMATED     METABOLIC PANEL, COMPREHENSIVE    Collection Time: 11/13/19 12:10 PM   Result Value Ref Range    Sodium 137 136 - 145 mmol/L    Potassium 3.4 (L) 3.5 - 5.5 mmol/L    Chloride 94 (L) 100 - 111 mmol/L    CO2 30 21 - 32 mmol/L    Anion gap 13 3.0 - 18 mmol/L    Glucose 121 (H) 74 - 99 mg/dL    BUN 57 (H) 7.0 - 18 MG/DL    Creatinine 1.62 (H) 0.6 - 1.3 MG/DL    BUN/Creatinine ratio 35 (H) 12 - 20      GFR est AA 38 (L) >60 ml/min/1.73m2    GFR est non-AA 32 (L) >60 ml/min/1.73m2    Calcium 11.2 (H) 8.5 - 10.1 MG/DL    Bilirubin, total 1.0 0.2 - 1.0 MG/DL    ALT (SGPT) 24 13 - 56 U/L    AST (SGOT) 30 10 - 38 U/L    Alk.  phosphatase 57 45 - 117 U/L    Protein, total 7.5 6.4 - 8.2 g/dL    Albumin 4.4 3.4 - 5.0 g/dL    Globulin 3.1 2.0 - 4.0 g/dL    A-G Ratio 1.4 0.8 - 1.7     ETHYL ALCOHOL    Collection Time: 11/13/19 12:10 PM   Result Value Ref Range    ALCOHOL(ETHYL),SERUM <3 0 - 3 MG/DL   SALICYLATE    Collection Time: 11/13/19 12:10 PM   Result Value Ref Range    Salicylate level <4.6 (L) 2.8 - 20.0 MG/DL   MAGNESIUM    Collection Time: 11/13/19 12:10 PM   Result Value Ref Range    Magnesium 2.0 1.6 - 2.6 mg/dL   ACETAMINOPHEN    Collection Time: 11/13/19 12:10 PM   Result Value Ref Range    Acetaminophen level <2 (L) 10.0 - 30.0 ug/mL   CARDIAC PANEL,(CK, CKMB & TROPONIN)    Collection Time: 11/13/19 12:10 PM   Result Value Ref Range     (H) 26 - 192 U/L    CK - MB 10.6 (H) <3.6 ng/ml    CK-MB Index 4.0 0.0 - 4.0 %    Troponin-I, QT 0.06 (H) 0.0 - 0.045 NG/ML   EKG, 12 LEAD, INITIAL    Collection Time: 11/13/19 12:16 PM   Result Value Ref Range    Ventricular Rate 53 BPM    Atrial Rate 53 BPM    P-R Interval 192 ms    QRS Duration 86 ms    Q-T Interval 514 ms    QTC Calculation (Bezet) 482 ms    Calculated P Axis 90 degrees    Calculated R Axis 86 degrees    Calculated T Axis 98 degrees    Diagnosis       Sinus bradycardia  Nonspecific ST abnormality  Abnormal ECG  When compared with ECG of 25-OCT-2017 16:00,  QT has lengthened     URINALYSIS W/ RFLX MICROSCOPIC    Collection Time: 11/13/19 12:55 PM   Result Value Ref Range    Color DARK YELLOW      Appearance CLEAR      Specific gravity 1.024 1.005 - 1.030      pH (UA) 5.5 5.0 - 8.0      Protein 30 (A) NEG mg/dL    Glucose 250 (A) NEG mg/dL    Ketone 15 (A) NEG mg/dL    Bilirubin NEGATIVE  NEG      Blood NEGATIVE  NEG      Urobilinogen 1.0 0.2 - 1.0 EU/dL    Nitrites NEGATIVE  NEG      Leukocyte Esterase NEGATIVE  NEG     URINE MICROSCOPIC ONLY    Collection Time: 11/13/19 12:55 PM   Result Value Ref Range    WBC 0 to 3 0 - 5 /hpf    RBC 0 to 3 0 - 5 /hpf    Epithelial cells FEW 0 - 5 /lpf    Bacteria FEW (A) NEG /hpf   GLUCOSE, POC    Collection Time: 11/13/19 12:57 PM   Result Value Ref Range Glucose (POC) 105 70 - 110 mg/dL       Procedures/imaging: see electronic medical records for all procedures/Xrays and details which were not copied into this note but were reviewed prior to creation of Plan        CC:  Nia Olea MD

## 2019-11-13 NOTE — ED TRIAGE NOTES
Pt arrives per EMS. EMS reports call was for welfare check per pt sister d/t not hearing from her for a week or so, pt was found on the lying on the floor with confusion ANO to self and place which is not her norm per sister. EMS reports no evidence of injury to head upon arrival and pt w/ no complaints of pain.

## 2019-11-13 NOTE — PROGRESS NOTES
[address] [place] [address] IN REPORT:    Verbal report received from [address] [place] [address] (name) on [address]  being received from ED (unit) for routine progression of care      Report consisted of patients Situation, Background, Assessment and   Recommendations(SBAR). Information from the following report(s) SBAR, Kardex, ED Summary, [facility] and Cardiac Rhythm SR was reviewed with the receiving nurse. Opportunity for questions and clarification was provided. Assessment completed upon patients arrival to unit and care assumed. 1710 Pt arrived on the unit. Care assumed    1915 Bedside and Verbal shift change report given to Faviola Murray RN (oncoming nurse) by Regine Serrano RN (offgoing nurse). Report included the following information SBAR, Kardex, MAR, Recent Results, Med Rec Status and Cardiac Rhythm .

## 2019-11-13 NOTE — ED PROVIDER NOTES
EMERGENCY DEPARTMENT HISTORY AND PHYSICAL EXAM    Date: 11/13/2019  Patient Name: Harsh Davis    History of Presenting Illness     Chief Complaint   Patient presents with    Altered mental status         History Provided By: Patient and EMS      Harsh Davis is a 77 y.o. female with PMHX of alcohol use, thyroid dysfunction, hypertension, heart disease who presents to the emergency department C/O altered mental status. Per EMS report they were called for a welfare check as the patient's sister had contacted them due to not hearing from the patient for about a week or so. Upon their arrival they found the patient lying on the floor and confused. They states she was oriented to self and place. The sister had noted that she does not normally have changes in mental status. They deny any obvious physical injuries. State the patient was slightly bradycardic with heart rate in the mid 50s with the remainder of her vital signs being normal.  Patient states she is unsure how long she has been on the floor. She does admit to some intermittent mild blurry vision. She denies any chest pain, shortness of breath, abdominal pain, nausea, vomiting, headache, back pain, hip pain. She states she has not been taking her medicines as she has not been given a specific regimen. PCP: Burnette Bosworth, MD    Current Facility-Administered Medications   Medication Dose Route Frequency Provider Last Rate Last Dose    potassium chloride 10 mEq in 100 ml IVPB  10 mEq IntraVENous Q1H Lanre Mcgovern  mL/hr at 11/13/19 1508 10 mEq at 11/13/19 1508    0.9% sodium chloride infusion  100 mL/hr IntraVENous ONCE Lanre Mcgovern DO        levothyroxine (SYNTHROID) tablet 112 mcg  112 mcg Oral ONCE Lanre Mcgovern DO         Current Outpatient Medications   Medication Sig Dispense Refill    valsartan-hydrochlorothiazide (DIOVAN-HCT) 80-12.5 mg per tablet Take 1 Tab by mouth daily.       pramipexole (MIRAPEX) 1 mg tablet Take 1 mg by mouth three (3) times daily.  levothyroxine (SYNTHROID) 112 mcg tablet Take  by mouth Daily (before breakfast).  ergocalciferol (ERGOCALCIFEROL) 50,000 unit capsule Take 50,000 Units by mouth.  buPROPion SR (WELLBUTRIN SR) 100 mg SR tablet Take 100 mg by mouth.  naproxen (NAPROSYN) 375 mg tablet Take 1 Tab by mouth two (2) times daily (with meals). 20 Tab 0       Past History     Past Medical History:  Past Medical History:   Diagnosis Date    CAD (coronary artery disease)     thyroid    Hypertension        Past Surgical History:  No past surgical history on file. Family History:  No family history on file. Social History:  Social History     Tobacco Use    Smoking status: Never Smoker    Smokeless tobacco: Never Used   Substance Use Topics    Alcohol use: Yes     Alcohol/week: 2.0 standard drinks     Types: 2 Glasses of wine per week     Comment: DAILY    Drug use: Not on file       Allergies:   Allergies   Allergen Reactions    Sulfa (Sulfonamide Antibiotics) Hives         Review of Systems   Review of Systems   Unable to perform ROS: Mental status change         Physical Exam     Vitals:    11/13/19 1300 11/13/19 1400 11/13/19 1430 11/13/19 1500   BP: 129/78 119/83 117/83 116/75   Pulse: (!) 47 (!) 51 (!) 55 81   Resp: 20 12 15 12   Temp:       SpO2: 100% 100% (!) 89% 100%   Weight:       Height:         Physical Exam    Nursing notes and vital signs reviewed    Constitutional: Cachectic, no acute distress  Head: Normocephalic, Atraumatic  Eyes: Pupils are equal, round, and reactive to light, EOMI  Neck: Supple  Cardiovascular: Bradycardic, regular rhythm, no murmurs, rubs, or gallops  Chest: Normal work of breathing and chest excursion bilaterally  Lungs: Clear to ausculation bilaterally  Abdomen: Soft, non tender, non distended, normoactive bowel sounds  Back: No evidence of trauma or deformity  Extremities: No evidence of trauma or deformity, no LE edema  Skin: Warm and dry, normal cap refill  Neuro: Patient is awake and oriented x2 to person and place. She is able to follow commands and answer questions although is slow, CN intact, normal speech, patient has full strength in her upper extremities although has only 2 out of 5 strength in the bilateral lower extremities. Sensation full and symmetric bilaterally, normal gait, normal coordination  Psychiatric: Slow to respond but normal affect    Diagnostic Study Results     Labs -     Recent Results (from the past 48 hour(s))   CBC WITH AUTOMATED DIFF    Collection Time: 11/13/19 12:10 PM   Result Value Ref Range    WBC 7.6 4.6 - 13.2 K/uL    RBC 4.76 4.20 - 5.30 M/uL    HGB 14.7 12.0 - 16.0 g/dL    HCT 43.0 35.0 - 45.0 %    MCV 90.3 74.0 - 97.0 FL    MCH 30.9 24.0 - 34.0 PG    MCHC 34.2 31.0 - 37.0 g/dL    RDW 13.2 11.6 - 14.5 %    PLATELET 579 341 - 312 K/uL    MPV 10.6 9.2 - 11.8 FL    NEUTROPHILS 84 (H) 40 - 73 %    LYMPHOCYTES 6 (L) 21 - 52 %    MONOCYTES 8 3 - 10 %    EOSINOPHILS 2 0 - 5 %    BASOPHILS 0 0 - 2 %    ABS. NEUTROPHILS 6.4 1.8 - 8.0 K/UL    ABS. LYMPHOCYTES 0.5 (L) 0.9 - 3.6 K/UL    ABS. MONOCYTES 0.6 0.05 - 1.2 K/UL    ABS. EOSINOPHILS 0.1 0.0 - 0.4 K/UL    ABS. BASOPHILS 0.0 0.0 - 0.1 K/UL    DF AUTOMATED     METABOLIC PANEL, COMPREHENSIVE    Collection Time: 11/13/19 12:10 PM   Result Value Ref Range    Sodium 137 136 - 145 mmol/L    Potassium 3.4 (L) 3.5 - 5.5 mmol/L    Chloride 94 (L) 100 - 111 mmol/L    CO2 30 21 - 32 mmol/L    Anion gap 13 3.0 - 18 mmol/L    Glucose 121 (H) 74 - 99 mg/dL    BUN 57 (H) 7.0 - 18 MG/DL    Creatinine 1.62 (H) 0.6 - 1.3 MG/DL    BUN/Creatinine ratio 35 (H) 12 - 20      GFR est AA 38 (L) >60 ml/min/1.73m2    GFR est non-AA 32 (L) >60 ml/min/1.73m2    Calcium 11.2 (H) 8.5 - 10.1 MG/DL    Bilirubin, total 1.0 0.2 - 1.0 MG/DL    ALT (SGPT) 24 13 - 56 U/L    AST (SGOT) 30 10 - 38 U/L    Alk.  phosphatase 57 45 - 117 U/L    Protein, total 7.5 6.4 - 8.2 g/dL Albumin 4.4 3.4 - 5.0 g/dL    Globulin 3.1 2.0 - 4.0 g/dL    A-G Ratio 1.4 0.8 - 1.7     ETHYL ALCOHOL    Collection Time: 11/13/19 12:10 PM   Result Value Ref Range    ALCOHOL(ETHYL),SERUM <3 0 - 3 MG/DL   SALICYLATE    Collection Time: 11/13/19 12:10 PM   Result Value Ref Range    Salicylate level <6.4 (L) 2.8 - 20.0 MG/DL   MAGNESIUM    Collection Time: 11/13/19 12:10 PM   Result Value Ref Range    Magnesium 2.0 1.6 - 2.6 mg/dL   ACETAMINOPHEN    Collection Time: 11/13/19 12:10 PM   Result Value Ref Range    Acetaminophen level <2 (L) 10.0 - 30.0 ug/mL   CARDIAC PANEL,(CK, CKMB & TROPONIN)    Collection Time: 11/13/19 12:10 PM   Result Value Ref Range     (H) 26 - 192 U/L    CK - MB 10.6 (H) <3.6 ng/ml    CK-MB Index 4.0 0.0 - 4.0 %    Troponin-I, QT 0.06 (H) 0.0 - 0.045 NG/ML   EKG, 12 LEAD, INITIAL    Collection Time: 11/13/19 12:16 PM   Result Value Ref Range    Ventricular Rate 53 BPM    Atrial Rate 53 BPM    P-R Interval 192 ms    QRS Duration 86 ms    Q-T Interval 514 ms    QTC Calculation (Bezet) 482 ms    Calculated P Axis 90 degrees    Calculated R Axis 86 degrees    Calculated T Axis 98 degrees    Diagnosis       Sinus bradycardia  Nonspecific ST abnormality  Abnormal ECG  When compared with ECG of 25-OCT-2017 16:00,  QT has lengthened     URINALYSIS W/ RFLX MICROSCOPIC    Collection Time: 11/13/19 12:55 PM   Result Value Ref Range    Color DARK YELLOW      Appearance CLEAR      Specific gravity 1.024 1.005 - 1.030      pH (UA) 5.5 5.0 - 8.0      Protein 30 (A) NEG mg/dL    Glucose 250 (A) NEG mg/dL    Ketone 15 (A) NEG mg/dL    Bilirubin NEGATIVE  NEG      Blood NEGATIVE  NEG      Urobilinogen 1.0 0.2 - 1.0 EU/dL    Nitrites NEGATIVE  NEG      Leukocyte Esterase NEGATIVE  NEG     URINE MICROSCOPIC ONLY    Collection Time: 11/13/19 12:55 PM   Result Value Ref Range    WBC 0 to 3 0 - 5 /hpf    RBC 0 to 3 0 - 5 /hpf    Epithelial cells FEW 0 - 5 /lpf    Bacteria FEW (A) NEG /hpf   GLUCOSE, POC Collection Time: 11/13/19 12:57 PM   Result Value Ref Range    Glucose (POC) 105 70 - 110 mg/dL       Radiologic Studies -   CT HEAD WO CONT   Final Result   IMPRESSION:      No acute intracranial abnormalities. Chronic microvascular insufficiency deep   white matter. Cortical and cerebellar volume loss advanced for age. XR CHEST PORT   Final Result   IMPRESSION:  Hyperinflation without acute cardiopulmonary disease. XR PELV 1 OR 2 V   Final Result   IMPRESSION:      No acute bony abnormality. CT Results  (Last 48 hours)               11/13/19 1418  CT HEAD WO CONT Final result    Impression:  IMPRESSION:       No acute intracranial abnormalities. Chronic microvascular insufficiency deep   white matter. Cortical and cerebellar volume loss advanced for age. Narrative:  EXAM: CRANIAL CT WITHOUT CONTRAST       INDICATION: Altered level of consciousness. Possible injury. COMPARISON: None. TECHNIQUE: Axial CT imaging of the head was performed without intravenous   contrast. One or more dose reduction techniques were used on this CT: automated   exposure control, adjustment of the mAs and/or kVp according to patient size,   and iterative reconstruction techniques. The specific techniques used on this   CT exam have been documented in the patient's electronic medical record. Digital   Imaging and Communications in Medicine (DICOM) format image data are available   to nonaffiliated external healthcare facilities or entities on a secure, media   free, reciprocally searchable basis with patient authorization for at least a   12-month period after this study. _______________       FINDINGS:       BRAIN AND POSTERIOR FOSSA: Ventricular system is midline. There is no   intracranial hemorrhage, mass effect, or midline shift. Low-attenuation areas   deep white matter most compatible with chronic microvascular insufficiency.    Prominent cortical and cerebellar volume loss advanced for age. EXTRA-AXIAL SPACES AND MENINGES: There are no abnormal extra-axial fluid   collections. CALVARIUM: Intact. SINUSES: Clear. OTHER: None.       _______________               CXR Results  (Last 48 hours)               11/13/19 1237  XR CHEST PORT Final result    Impression:  IMPRESSION:  Hyperinflation without acute cardiopulmonary disease. Narrative:  EXAM:  PORTABLE CHEST       INDICATION:  Altered level consciousness. Found on floor. TECHNIQUE:  Portable, erect AP view. COMPARISON:  10/25/2017       ____________________       FINDINGS:         SUPPORT DEVICES: None. HEART AND MEDIASTINUM: Cardiomediastinal silhouette appears within normal   limits. Normal caliber thoracic aorta. LUNGS AND PLEURAL SPACES: Lungs are inflated with no confluent airspace opacity   or pulmonary edema. No pleural effusion or pneumothorax. BONY THORAX AND SOFT TISSUES: No acute osseous abnormality. Degenerative change   around the visualized shoulders. ____________________                 Medications given in the ED-  Medications   potassium chloride 10 mEq in 100 ml IVPB (10 mEq IntraVENous New Bag 11/13/19 1508)   0.9% sodium chloride infusion (has no administration in time range)   levothyroxine (SYNTHROID) tablet 112 mcg (has no administration in time range)   sodium chloride 0.9 % bolus infusion 1,000 mL (1,000 mL IntraVENous New Bag 11/13/19 1341)         Medical Decision Making   I am the first provider for this patient. I reviewed the vital signs, available nursing notes, past medical history, past surgical history, family history and social history. Vital Signs-Reviewed the patient's vital signs.     Pulse Oximetry Analysis - 99% on room air     Cardiac Monitor:  Rate: 53 bpm  Rhythm: sinus mely    EKG interpretation: (Preliminary)  EKG read by Dr. Gaffney Cruistaylor rate 53 sinus bradycardia, normal axis, no ST changes    Records Reviewed: Nursing Notes and Old Medical Records    Procedures:  Procedures    ED Course:   Patient continued to have bradycardia between 45 and 55 although her blood pressure remained stable. Laboratory findings showing mildly low potassium. She was given IV replacement. EMILI was also noted as her creatinine has doubled over the last year although question if this is more chronic in nature. IV fluids were given. She remained mildly altered during her emergency department stay. Considering this will plan for admission for continued treatment and observation. Patient's sister at bedside understands and is agreeable to this plan    CONSULT NOTE:   Dr. Freedom Jolly spoke with Dr. Agee   Specialty: Cardiology  Discussed pt's hx, disposition, and available diagnostic and imaging results over the telephone. Reviewed care plans. He recommends continuing to trend the troponin and to continue monitoring the bradycardia and to hold off on atropine at this time as her blood pressure is stable. .       Diagnosis and Disposition       Core Measures:  For Hospitalized Patients:    1. Hospitalization Decision Time:  The decision to hospitalize the patient was made by Freedom Jolly DO at 3:37 PM on 11/13/2019    2. Aspirin: Aspirin was not given because the patient did not present with a stroke at the time of their Emergency Department evaluation    3:37 PM  Patient is being admitted to the hospital by Dr. Jorge Escobedo. The results of their tests and reasons for their admission have been discussed with them and/or available family. They convey agreement and understanding for the need to be admitted and for their admission diagnosis. CONDITIONS ON ADMISSION:  Sepsis is not present at the time of admission. Deep Vein Thrombosis is not present at the time of admission. Thrombosis is not present at the time of admission. Urinary Tract Infection is not present at the time of admission.  Pneumonia is not present at the time of admission. MRSA is not present at the time of admission. Wound infection is not present at the time of admission. Pressure Ulcer is not present at the time of admission. CLINICAL IMPRESSION:    1. Altered mental status, unspecified altered mental status type    2. Sinus bradycardia    3. EMILI (acute kidney injury) ([facility] [address]    4. Generalized weakness    5. Elevated troponin          Please note that this dictation was completed with [employer], the computer voice recognition software. Quite often unanticipated grammatical, syntax, homophones, and other interpretive errors are inadvertently transcribed by the computer software. Please disregard these errors. Please excuse any errors that have escaped final proofreading.

## 2019-11-13 NOTE — PROGRESS NOTES
Problem: Pressure Injury - Risk of  Goal: *Prevention of pressure injury  Description  Document Tremayne Scale and appropriate interventions in the flowsheet.   Outcome: Progressing Towards Goal  Note:   Pressure Injury Interventions:       Moisture Interventions: Absorbent underpads, Internal/External urinary devices    Activity Interventions: PT/OT evaluation    Mobility Interventions: HOB 30 degrees or less    Nutrition Interventions: Offer support with meals,snacks and hydration    Friction and Shear Interventions: Apply protective barrier, creams and emollients

## 2019-11-13 NOTE — ROUTINE PROCESS
TRANSFER - OUT REPORT: 
 
Verbal report given to MillaRN(name) on Lanre Gant  being transferred to 3N(unit) for routine progression of care Report consisted of patients Situation, Background, Assessment and  
Recommendations(SBAR). Information from the following report(s) SBAR, ED Summary, MAR, Recent Results and Cardiac Rhythm NSR was reviewed with the receiving nurse. Lines:  
Peripheral IV 11/13/19 Right Antecubital (Active) Site Assessment Clean, dry, & intact 11/13/2019 12:16 PM  
Phlebitis Assessment 0 11/13/2019 12:16 PM  
Infiltration Assessment 0 11/13/2019 12:16 PM  
Dressing Status Clean, dry, & intact 11/13/2019 12:16 PM  
Dressing Type Transparent 11/13/2019 12:16 PM  
Hub Color/Line Status Pink 11/13/2019 12:16 PM  
Action Taken Blood drawn 11/13/2019 12:16 PM  
Alcohol Cap Used Yes 11/13/2019 12:16 PM  
  
 
Opportunity for questions and clarification was provided. Patient transported with: 
 Monitor + Paramedic

## 2019-11-14 ENCOUNTER — APPOINTMENT (OUTPATIENT)
Dept: GENERAL RADIOLOGY | Age: 69
DRG: 064 | End: 2019-11-14
Attending: FAMILY MEDICINE
Payer: MEDICARE

## 2019-11-14 ENCOUNTER — APPOINTMENT (OUTPATIENT)
Dept: MRI IMAGING | Age: 69
DRG: 064 | End: 2019-11-14
Attending: HOSPITALIST
Payer: MEDICARE

## 2019-11-14 ENCOUNTER — APPOINTMENT (OUTPATIENT)
Dept: NON INVASIVE DIAGNOSTICS | Age: 69
DRG: 064 | End: 2019-11-14
Attending: INTERNAL MEDICINE
Payer: MEDICARE

## 2019-11-14 PROBLEM — F10.10 ALCOHOL ABUSE: Status: ACTIVE | Noted: 2019-11-14

## 2019-11-14 PROBLEM — G93.40 ACUTE ENCEPHALOPATHY: Status: ACTIVE | Noted: 2019-11-13

## 2019-11-14 PROBLEM — T68.XXXA HYPOTHERMIA: Status: ACTIVE | Noted: 2019-11-14

## 2019-11-14 LAB
ANION GAP SERPL CALC-SCNC: 8 MMOL/L (ref 3–18)
AV PEAK GRADIENT: 24.15 MMHG
AV VELOCITY RATIO: 0.88
AV VTI RATIO: 0.9
BASOPHILS # BLD: 0 K/UL (ref 0–0.1)
BASOPHILS NFR BLD: 0 % (ref 0–2)
BUN SERPL-MCNC: 50 MG/DL (ref 7–18)
BUN/CREAT SERPL: 42 (ref 12–20)
CALCIUM SERPL-MCNC: 9.1 MG/DL (ref 8.5–10.1)
CHLORIDE SERPL-SCNC: 105 MMOL/L (ref 100–111)
CO2 SERPL-SCNC: 26 MMOL/L (ref 21–32)
CREAT SERPL-MCNC: 1.2 MG/DL (ref 0.6–1.3)
DIFFERENTIAL METHOD BLD: ABNORMAL
ECHO AO ASC DIAM: 3.82 CM
ECHO AO ROOT DIAM: 3.92 CM
ECHO AV AREA PEAK VELOCITY: 2.7 CM2
ECHO AV AREA VTI: 2.9 CM2
ECHO AV AREA/BSA PEAK VELOCITY: 1.6 CM2/M2
ECHO AV AREA/BSA VTI: 1.7 CM2/M2
ECHO AV MEAN GRADIENT: 3.9 MMHG
ECHO AV MEAN VELOCITY: 0.93 M/S
ECHO AV PEAK GRADIENT: 6.7 MMHG
ECHO AV PEAK VELOCITY: 129.31 CM/S
ECHO AV REGURGITANT PHT: 1181.7 CM
ECHO AV VTI: 30.47 CM
ECHO IVC PROX: 2.38 CM
ECHO LA MAJOR AXIS: 2.91 CM
ECHO LA VOL 2C: 30.42 ML (ref 22–52)
ECHO LA VOL 4C: 23.41 ML (ref 22–52)
ECHO LA VOL BP: 30.62 ML (ref 22–52)
ECHO LA VOL/BSA BIPLANE: 18.11 ML/M2 (ref 16–28)
ECHO LA VOLUME INDEX A2C: 17.99 ML/M2 (ref 16–28)
ECHO LA VOLUME INDEX A4C: 13.85 ML/M2 (ref 16–28)
ECHO LV E' LATERAL VELOCITY: 8 CM/S
ECHO LV E' SEPTAL VELOCITY: 7 CM/S
ECHO LV EDV A2C: 56.9 ML
ECHO LV EDV A4C: 56.6 ML
ECHO LV EDV BP: 62.3 ML (ref 56–104)
ECHO LV EDV INDEX A4C: 33.5 ML/M2
ECHO LV EDV INDEX BP: 36.8 ML/M2
ECHO LV EDV NDEX A2C: 33.7 ML/M2
ECHO LV EDV TEICHHOLZ: 0.26 ML
ECHO LV EJECTION FRACTION A2C: 62 %
ECHO LV EJECTION FRACTION A4C: 72 %
ECHO LV EJECTION FRACTION BIPLANE: 66.8 % (ref 55–100)
ECHO LV ESV A2C: 21.9 ML
ECHO LV ESV A4C: 15.8 ML
ECHO LV ESV BP: 20.7 ML (ref 19–49)
ECHO LV ESV INDEX A2C: 13 ML/M2
ECHO LV ESV INDEX A4C: 9.3 ML/M2
ECHO LV ESV INDEX BP: 12.2 ML/M2
ECHO LV ESV TEICHHOLZ: 0.12 ML
ECHO LV INTERNAL DIMENSION DIASTOLIC: 3.33 CM (ref 3.9–5.3)
ECHO LV INTERNAL DIMENSION SYSTOLIC: 2.41 CM
ECHO LV IVSD: 1.01 CM (ref 0.6–0.9)
ECHO LV MASS 2D: 106 G (ref 67–162)
ECHO LV MASS INDEX 2D: 62.7 G/M2 (ref 43–95)
ECHO LV POSTERIOR WALL DIASTOLIC: 0.99 CM (ref 0.6–0.9)
ECHO LVOT DIAM: 1.99 CM
ECHO LVOT PEAK GRADIENT: 5.2 MMHG
ECHO LVOT PEAK VELOCITY: 113.5 CM/S
ECHO LVOT VTI: 28.27 CM
ECHO MV A VELOCITY: 50.79 CM/S
ECHO MV AREA PHT: 4 CM2
ECHO MV E DECELERATION TIME (DT): 188.1 MS
ECHO MV E VELOCITY: 119.27 CM/S
ECHO MV E/A RATIO: 2.35
ECHO MV E/E' LATERAL: 14.91
ECHO MV E/E' RATIO (AVERAGED): 15.97
ECHO MV E/E' SEPTAL: 17.04
ECHO MV PRESSURE HALF TIME (PHT): 54.6 MS
ECHO PULMONARY ARTERY SYSTOLIC PRESSURE (PASP): 33 MMHG
ECHO RA AREA 4C: 16.87 CM2
ECHO RV INTERNAL DIMENSION: 4.57 CM
ECHO TRICUSPID ANNULAR PEAK SYSTOLIC VELOCITY: 2.3 CM/S
ECHO TV REGURGITANT MAX VELOCITY: 212.82 CM/S
ECHO TV REGURGITANT PEAK GRADIENT: 18.1 MMHG
EOSINOPHIL # BLD: 0.2 K/UL (ref 0–0.4)
EOSINOPHIL NFR BLD: 3 % (ref 0–5)
ERYTHROCYTE [DISTWIDTH] IN BLOOD BY AUTOMATED COUNT: 13.3 % (ref 11.6–14.5)
GLUCOSE SERPL-MCNC: 79 MG/DL (ref 74–99)
HCT VFR BLD AUTO: 37.1 % (ref 35–45)
HGB BLD-MCNC: 12.3 G/DL (ref 12–16)
LACTATE SERPL-SCNC: 1 MMOL/L (ref 0.4–2)
LVFS 2D: 27.57 %
LVOT MG: 2.47 MMHG
LVOT MV: 0.71 CM/S
LVSV (MOD BI): 24.96 ML
LVSV (MOD SINGLE 4C): 24.44 ML
LVSV (MOD SINGLE): 21.02 ML
LVSV (TEICH): 14.81 ML
LYMPHOCYTES # BLD: 0.6 K/UL (ref 0.9–3.6)
LYMPHOCYTES NFR BLD: 11 % (ref 21–52)
MAGNESIUM SERPL-MCNC: 1.7 MG/DL (ref 1.6–2.6)
MCH RBC QN AUTO: 30.4 PG (ref 24–34)
MCHC RBC AUTO-ENTMCNC: 33.2 G/DL (ref 31–37)
MCV RBC AUTO: 91.6 FL (ref 74–97)
MONOCYTES # BLD: 0.3 K/UL (ref 0.05–1.2)
MONOCYTES NFR BLD: 7 % (ref 3–10)
MV DEC SLOPE: 6.34
NEUTS SEG # BLD: 4 K/UL (ref 1.8–8)
NEUTS SEG NFR BLD: 79 % (ref 40–73)
PISA AR MAX VEL: 245.71 CM/S
PLATELET # BLD AUTO: 163 K/UL (ref 135–420)
PMV BLD AUTO: 10.4 FL (ref 9.2–11.8)
POTASSIUM SERPL-SCNC: 3.6 MMOL/L (ref 3.5–5.5)
RBC # BLD AUTO: 4.05 M/UL (ref 4.2–5.3)
SODIUM SERPL-SCNC: 139 MMOL/L (ref 136–145)
WBC # BLD AUTO: 5.1 K/UL (ref 4.6–13.2)

## 2019-11-14 PROCEDURE — 80048 BASIC METABOLIC PNL TOTAL CA: CPT

## 2019-11-14 PROCEDURE — 87077 CULTURE AEROBIC IDENTIFY: CPT

## 2019-11-14 PROCEDURE — 87086 URINE CULTURE/COLONY COUNT: CPT

## 2019-11-14 PROCEDURE — 93306 TTE W/DOPPLER COMPLETE: CPT

## 2019-11-14 PROCEDURE — 97530 THERAPEUTIC ACTIVITIES: CPT

## 2019-11-14 PROCEDURE — 74011250637 HC RX REV CODE- 250/637: Performed by: FAMILY MEDICINE

## 2019-11-14 PROCEDURE — 74011250636 HC RX REV CODE- 250/636: Performed by: FAMILY MEDICINE

## 2019-11-14 PROCEDURE — 74011250636 HC RX REV CODE- 250/636: Performed by: HOSPITALIST

## 2019-11-14 PROCEDURE — 74011250637 HC RX REV CODE- 250/637: Performed by: HOSPITALIST

## 2019-11-14 PROCEDURE — 71045 X-RAY EXAM CHEST 1 VIEW: CPT

## 2019-11-14 PROCEDURE — 83735 ASSAY OF MAGNESIUM: CPT

## 2019-11-14 PROCEDURE — 87040 BLOOD CULTURE FOR BACTERIA: CPT

## 2019-11-14 PROCEDURE — 85025 COMPLETE CBC W/AUTO DIFF WBC: CPT

## 2019-11-14 PROCEDURE — 65660000000 HC RM CCU STEPDOWN

## 2019-11-14 PROCEDURE — 97165 OT EVAL LOW COMPLEX 30 MIN: CPT

## 2019-11-14 PROCEDURE — 36415 COLL VENOUS BLD VENIPUNCTURE: CPT

## 2019-11-14 PROCEDURE — 83605 ASSAY OF LACTIC ACID: CPT

## 2019-11-14 PROCEDURE — 70551 MRI BRAIN STEM W/O DYE: CPT

## 2019-11-14 RX ORDER — LEVOFLOXACIN 5 MG/ML
500 INJECTION, SOLUTION INTRAVENOUS EVERY 24 HOURS
Status: DISCONTINUED | OUTPATIENT
Start: 2019-11-14 | End: 2019-11-14

## 2019-11-14 RX ORDER — POTASSIUM CHLORIDE 20 MEQ/1
40 TABLET, EXTENDED RELEASE ORAL
Status: COMPLETED | OUTPATIENT
Start: 2019-11-14 | End: 2019-11-14

## 2019-11-14 RX ORDER — PANTOPRAZOLE SODIUM 40 MG/1
40 TABLET, DELAYED RELEASE ORAL
Status: DISCONTINUED | OUTPATIENT
Start: 2019-11-15 | End: 2019-11-21 | Stop reason: HOSPADM

## 2019-11-14 RX ORDER — LORAZEPAM 2 MG/ML
0.5 INJECTION INTRAMUSCULAR ONCE
Status: COMPLETED | OUTPATIENT
Start: 2019-11-14 | End: 2019-11-14

## 2019-11-14 RX ADMIN — Medication 10 ML: at 22:41

## 2019-11-14 RX ADMIN — SODIUM CHLORIDE 1000 ML: 900 INJECTION, SOLUTION INTRAVENOUS at 04:00

## 2019-11-14 RX ADMIN — HEPARIN SODIUM 5000 UNITS: 5000 INJECTION INTRAVENOUS; SUBCUTANEOUS at 23:00

## 2019-11-14 RX ADMIN — HEPARIN SODIUM 5000 UNITS: 5000 INJECTION INTRAVENOUS; SUBCUTANEOUS at 17:41

## 2019-11-14 RX ADMIN — LEVOTHYROXINE SODIUM 175 MCG: 100 TABLET ORAL at 08:17

## 2019-11-14 RX ADMIN — LORAZEPAM 0.5 MG: 2 INJECTION INTRAMUSCULAR; INTRAVENOUS at 14:58

## 2019-11-14 RX ADMIN — POTASSIUM CHLORIDE 40 MEQ: 1500 TABLET, EXTENDED RELEASE ORAL at 12:31

## 2019-11-14 RX ADMIN — SODIUM CHLORIDE 75 ML/HR: 900 INJECTION, SOLUTION INTRAVENOUS at 17:40

## 2019-11-14 RX ADMIN — BUPROPION HYDROCHLORIDE 300 MG: 150 TABLET, FILM COATED, EXTENDED RELEASE ORAL at 08:17

## 2019-11-14 RX ADMIN — Medication 10 ML: at 14:00

## 2019-11-14 RX ADMIN — HEPARIN SODIUM 5000 UNITS: 5000 INJECTION INTRAVENOUS; SUBCUTANEOUS at 08:18

## 2019-11-14 NOTE — PROGRESS NOTES
Problem: Self Care Deficits Care Plan (Adult)  Goal: *Acute Goals and Plan of Care (Insert Text)  Description  Occupational Therapy Goals  Initiated 11/14/2019 within 7 day(s). 1.  Patient will perform grooming tasks while standing with contact guard assistance for balance. 2.  Patient will perform lower body dressing with supervision and fair+ dynamic sitting balance. 3.  Patient will perform functional task in standing for 8 minutes with contact guard assist and minimal verbal cues for safety. 4.  Patient will perform toilet transfers with supervision/set-up. 5.  Patient will perform all aspects of toileting with supervision/set-up. 6.  Patient will participate in upper extremity therapeutic exercise/activities with supervision/set-up for 8 minutes in prep for ADLs. Outcome: Progressing Towards Goal     OCCUPATIONAL THERAPY EVALUATION    Patient: Ana Luisa Navarro (4 y.o. female)  Date: 11/14/2019  Primary Diagnosis: Altered mental status [R41.82]        Precautions:  Fall  PLOF: Pt reports independence with ADLs, IADLs, community integration & functional mobility. ASSESSMENT :  Based on the objective data described below, the patient presents with impairments with regard to bed mobility, functional transfers & ADLs secondary to AMS. Pt supine on arrival, alert & oriented to self & year (with cues). Disoriented to place & situation. Family at bedside. Per pt & family report, pt was independent PTA. SBA  & additional time for supine to sit. Fair sitting balance; mod A to manage bilateral socks. Fair+ strength of BUEs. Mod A x1 to stand with RW; posterior lean with mod A to prevent LOB backwards. With cueing; pt able to briefly self correct during side steps in prep for toilet transfer. Deferred further OOB activity due to significant posterior lean. Pt left supine with family at bedside & RN preparing pt for transport to MRI. Educated on role of OT and POC; may need reinforcement.  At this time, recommend SNF upon d/c as pt lives alone with bedroom on 2nd floor; current mobility demo's poor safety awareness & high fall risk . Patient will benefit from skilled intervention to address the above impairments. Patient's rehabilitation potential is considered to be Good  Factors which may influence rehabilitation potential include:   ? None noted  ? Mental ability/status  ? Medical condition  ? Home/family situation and support systems  ? Safety awareness  ? Pain tolerance/management  ? Other:      PLAN :  Recommendations and Planned Interventions:   ?               Self Care Training                  ? Therapeutic Activities  ? Functional Mobility Training   ? Cognitive Retraining  ? Therapeutic Exercises           ? Endurance Activities  ? Balance Training                    ? Neuromuscular Re-Education  ? Visual/Perceptual Training     ? Home Safety Training  ? Patient Education                   ? Family Training/Education  ? Other (comment):    Frequency/Duration: Patient will be followed by occupational therapy 3-5 times a week to address goals. Discharge Recommendations: Moises Nelson  Further Equipment Recommendations for Discharge: shower chair      SUBJECTIVE:   Patient stated I'm 100% better.     OBJECTIVE DATA SUMMARY:     Past Medical History:   Diagnosis Date    CAD (coronary artery disease)     thyroid    Hypertension    No past surgical history on file.   Barriers to Learning/Limitations: yes;  altered mental status (i.e.Sedation, Confusion)  Compensate with: visual, verbal, tactile, kinesthetic cues/model    Home Situation:   Home Situation  Home Environment: (Condo)  # Steps to Enter: 2  One/Two Story Residence: Two story  # of Interior Steps: (pt sister unsure; approx 10 steps)  Living Alone: Yes  Support Systems: Family member(s)  Patient Expects to be Discharged to[de-identified] Unknown  Current DME Used/Available at Home: Grab bars  Tub or Shower Type: Tub/Shower combination(has grab bars; no shower chair)  ? Right hand dominant   ? Left hand dominant    Cognitive/Behavioral Status:  Neurologic State: Alert  Orientation Level: Oriented to person;Disoriented to place; Disoriented to situation;Disoriented to time  Cognition: Follows commands  Safety/Judgement: Decreased insight into deficits    Skin: Intact (BUEs)  Edema: None noted (BUEs)    Vision/Perceptual:    Acuity: Within Defined Limits      Coordination: BUE  Coordination: Within functional limits  Fine Motor Skills-Upper: Right Intact; Left Intact    Gross Motor Skills-Upper: Right Intact; Left Intact    Balance:  Sitting: Impaired  Sitting - Static: Fair (occasional)  Sitting - Dynamic: Fair (occasional)  Standing: Impaired; With support  Standing - Static: Fair(Fair-)  Standing - Dynamic : Poor(Poor+)    Strength: BUE  Strength: Generally decreased, functional    Range of Motion: BUE  AROM: Within functional limits  PROM: Within functional limits    Functional Mobility and Transfers for ADLs:  Bed Mobility:  Supine to Sit: Stand-by assistance; Additional time  Sit to Supine: Minimum assistance(BLE management)    Transfers:  Sit to Stand: Moderate assistance;Assist x1  Stand to Sit: Minimum assistance;Assist x1   Toilet Transfer : (not assessed due to posterior lean)     ADL Assessment:   Feeding: Supervision  Oral Facial Hygiene/Grooming: Supervision  Bathing: Moderate assistance  Upper Body Dressing: Setup;Supervision  Lower Body Dressing: Moderate assistance  Toileting: Moderate assistance    Cognitive Retraining  Safety/Judgement: Decreased insight into deficits    Therapeutic Activity:  Static & dynamic standing balance with RW in prep for ADLs while standing at sink; mod A for balance due to posterior lean.  Side stepping with mod A for support in prep for functional transfer to [facility]. Pain:  Pain level pre-treatment: 0/10   Pain level post-treatment: 0/10     Activity Tolerance:  Fair-  Please refer to the flowsheet for vital signs taken during this treatment. After treatment:   ? Patient left in no apparent distress sitting up in chair  ? Patient left in no apparent distress in bed  ? Call bell left within reach  ? Nursing notified  ? Caregiver present  ? Bed alarm activated    COMMUNICATION/EDUCATION:   ? Role of Occupational Therapy in the acute care setting  ? Home safety education was provided and the patient/caregiver indicated understanding. ? Patient/family have participated as able in goal setting and plan of care. ? Patient/family agree to work toward stated goals and plan of care. ? Patient understands intent and goals of therapy, but is neutral about his/her participation. ? Patient is unable to participate in goal setting and plan of care. Thank you for this referral.  Faviola Claudio MS OTR/L  Time Calculation: 23 mins    Eval Complexity: History: LOW Complexity : Brief history review ; Examination: LOW Complexity : 1-3 performance deficits relating to physical, cognitive , or psychosocial skils that result in activity limitations and / or participation restrictions ;    Decision Making:LOW Complexity : No comorbidities that affect functional and no verbal or physical assistance needed to complete eval tasks

## 2019-11-14 NOTE — PROGRESS NOTES
RRT Medic called to evaluate pt for possible code sepsis. Pt had temp of 94.7. Oral repeated at 97.7 however rectal st 95.7. All other vitals stable. STAT Labs drawn. Lactic at 1.1. No other SIRS met. Code sepsis cancelled.

## 2019-11-14 NOTE — PROGRESS NOTES
0325- In Pt room for hourly rounding, noted Pt's ears and fingers bluish. Pt reported she is cold. Room warmed, extra blanket provided. Vitals obtained and oral temp 94.3, MD paged. Dr Jennifer Hoffman will be in to assess Pt.     0330- MD and Medic in to evaluate Pt. Horacio Bethea ordered, chest xray, bolus fluids , blood cultures and urine cultures. Pt bladder scan >450 cc. Pt encouraged to get up and void. Pt able to void 500 cc of quinn urine.

## 2019-11-14 NOTE — PROGRESS NOTES
Problem: Pressure Injury - Risk of  Goal: *Prevention of pressure injury  Description  Document Tremayne Scale and appropriate interventions in the flowsheet.   Outcome: Progressing Towards Goal  Note:   Pressure Injury Interventions:  Sensory Interventions: Assess changes in LOC    Moisture Interventions: Internal/External urinary devices    Activity Interventions: Pressure redistribution bed/mattress(bed type), PT/OT evaluation    Mobility Interventions: PT/OT evaluation, Pressure redistribution bed/mattress (bed type)    Nutrition Interventions: Document food/fluid/supplement intake    Friction and Shear Interventions: HOB 30 degrees or less, Apply protective barrier, creams and emollients                Problem: Patient Education: Go to Patient Education Activity  Goal: Patient/Family Education  Outcome: Progressing Towards Goal

## 2019-11-14 NOTE — PROGRESS NOTES
Paged by the RN that the patient's temperature was 94.7. She also has had no urine output since admission. I went to evaluate the patient and her rectal temperature was 95.7. A code sepsis was initiated. An order was placed for a bear hugger. Blood cultures, urine culture, chest x-ray,  lactic acid, and CBC were ordered. A bolus of saline was given. I will await lactic acid result before starting antibiotics. Bladder scan was performed which showed greater than 450 cc so the patient will try to use the bathroom.

## 2019-11-14 NOTE — ROUTINE PROCESS
Bedside and Verbal shift change report given to Karina Xie RN (oncoming nurse) by Sharmin Manriquez RN (offgoing nurse). Report included the following information SBAR and Kardex.

## 2019-11-14 NOTE — CONSULTS
[facility] Consult Note      Patient: Jen Hardin MRN: [id number]  SSN: [id number]    YOB: 1950  Age: 77 y.o. Sex: female    Date of Consultation: 11/13/2019  Referring Physician: Nataliia Fine  Reason for Consultation: Bradycardia    Chief complain: Altered mental status    Chief complain: 18-year-old female brought to the emergency room as she was found on the floor and confused. As per EMS they were called for a welfare check as patient was not seen by anybody over a week and her brother who lives in [place] he called police to do welfare check. Patient was found on floor and she was confused. Cardiology consult was called for bradycardia. Patient mentioned that she passed out but she does not remember how long she was on the floor. She denies any chest pain, shortness of breath, dizziness, palpitation, presyncope or syncope. She denies any smoking. She denies any alcohol abuse. Past Medical History:   Diagnosis Date    CAD (coronary artery disease)     thyroid    Hypertension      No past surgical history on file. Current Facility-Administered Medications   Medication Dose Route Frequency    [START ON 11/15/2019] 0.9% sodium chloride 1,000 mL with mvi, adult no. 4 with vit K 10 mL, thiamine 106 mg, folic acid 1 mg infusion   IntraVENous DAILY    [START ON 11/15/2019] pantoprazole (PROTONIX) tablet 40 mg  40 mg Oral ACB    0.9% sodium chloride infusion  75 mL/hr IntraVENous CONTINUOUS    acetaminophen (TYLENOL) tablet 650 mg  650 mg Oral Q4H PRN    HYDROcodone-acetaminophen (NORCO) 5-325 mg per tablet 1 Tab  1 Tab Oral Q4H PRN    naloxone (NARCAN) injection 0.4 mg  0.4 mg IntraVENous PRN    heparin (porcine) injection 5,000 Units  5,000 Units SubCUTAneous Q8H    pramipexole (MIRAPEX) tablet 1 mg  1 mg Oral TID    influenza vaccine 2019-20 (6 mos+)(PF) (FLUARIX/FLULAVAL/FLUZONE QUAD) injection 0.5 mL  0.5 mL IntraMUSCular PRIOR TO DISCHARGE    sodium chloride (NS) flush 5-40 mL 5-40 mL IntraVENous Q8H    sodium chloride (NS) flush 5-40 mL  5-40 mL IntraVENous PRN    LORazepam (ATIVAN) tablet 1 mg  1 mg Oral Q1H PRN    Or    LORazepam (ATIVAN) injection 1 mg  1 mg IntraVENous Q1H PRN    LORazepam (ATIVAN) tablet 2 mg  2 mg Oral Q1H PRN    Or    LORazepam (ATIVAN) injection 2 mg  2 mg IntraVENous Q1H PRN    LORazepam (ATIVAN) injection 3 mg  3 mg IntraVENous Q15MIN PRN    buPROPion XL (WELLBUTRIN XL) tablet 300 mg  300 mg Oral 7am    levothyroxine (SYNTHROID) tablet 175 mcg  175 mcg Oral ACB       Allergies and Intolerances: Allergies   Allergen Reactions    Sulfa (Sulfonamide Antibiotics) Hives       Family History:   No family history on file. Social History:   She  reports that she has never smoked. She has never used smokeless tobacco.  She  reports that she drinks about 2.0 standard drinks of alcohol per week. Review of Systems:     Gen: No fever, chills, malaise, weight loss/gain. Heent: No headache, rhinorrhea, epistaxis, ear pain, hearing loss, sinus pain, neck pain/stiffness, sore throat. Heart: No chest pain, palpitations, shortness of breath, pnd, or orthopnea. Resp: No cough, hemoptysis, wheezing and dyspnea   GI: No nausea, vomiting, diarrhea, constipation, melena or hematochezia. : No urinary obstruction, dysuria or hematuria. Derm: No rash, new skin lesion or pruritis. Musc/skeletal: no bone or joint complains. Vasc: No edema, cyanosis or claudication. Endo: No heat/cold intolerance, no polyuria,polydipsia or polyphagia. Neuro: No unilateral weakness, numbness, tingling. No seizures. Heme: No easy bruising or bleeding. Physical:   Patient Vitals for the past 6 hrs:   Temp Pulse Resp BP SpO2   11/14/19 1746    100/55    11/14/19 1648 97.5 °F (36.4 °C) (!) 57 16 (!) 85/58 100 %         Exam:   General Appearance: Comfortable, not using accessory muscles of respiration. HEENT: MEGAN.    HEAD: Atraumatic  NECK: No JVD, no thyroidomeglay. CAROTIDS:no bruit  LUNGS: Clear bilaterally. HEART: S1+S2 audible, no murmur, no pericardial rub. ABD: Non-tender, BS Audible    EXT: No edema, and no cysnosis. VASCULAR EXAM: Pulses are intact. PSYCHIATRIC EXAM: Mood is appropriate. MUSCULOSKELETAL: Grossly no joint deformity. NEUROLOGICAL: Alert, follows verbal commands. Review of Data:   LABS:   Lab Results   Component Value Date/Time    WBC 5.1 11/14/2019 03:50 AM    HGB 12.3 11/14/2019 03:50 AM    HCT 37.1 11/14/2019 03:50 AM    PLATELET 240 14/97/4537 03:50 AM     Lab Results   Component Value Date/Time    Sodium 139 11/14/2019 03:50 AM    Potassium 3.6 11/14/2019 03:50 AM    Chloride 105 11/14/2019 03:50 AM    CO2 26 11/14/2019 03:50 AM    Glucose 79 11/14/2019 03:50 AM    BUN 50 (H) 11/14/2019 03:50 AM    Creatinine 1.20 11/14/2019 03:50 AM     No results found for: CHOL, CHOLX, CHLST, CHOLV, HDL, HDLP, LDL, LDLC, DLDLP, TGLX, TRIGL, TRIGP  No results found for: GPT  No results found for: HBA1C, HGBE8, VFJ8YTKH, BSF3NZWX      Cardiology Procedures:   Results for orders placed or performed during the hospital encounter of 11/13/19   EKG, 12 LEAD, INITIAL   Result Value Ref Range    Ventricular Rate 53 BPM    Atrial Rate 53 BPM    P-R Interval 192 ms    QRS Duration 86 ms    Q-T Interval 514 ms    QTC Calculation (Bezet) 482 ms    Calculated P Axis 90 degrees    Calculated R Axis 86 degrees    Calculated T Axis 98 degrees    Diagnosis       Poor data quality, interpretation may be adversely affected  Sinus bradycardia  Nonspecific ST abnormality  Abnormal ECG  Confirmed by Rufino Laird MD, Lin Segovia ([id number]) on 11/13/2019 7:15:50 PM             Impression / Plan:    Patient Active Problem List   Diagnosis Code    Acute encephalopathy G93.40    Sinus bradycardia R00.1    EMILI (acute kidney injury) ([facility] [address] N17.9    Generalized weakness R53.1    Elevated troponin R79.89    Alcohol abuse F10.10    Hypothermia T68. XXXA     Abnormal troponin No evidence of acute coronary syndrome may be due to demand ischemia. Hypothyroidism     61-year-old female brought to emergency room after she was found on the floor. Patient was confused on arrival.  She denies any chest pain or shortness of breath. Echocardiogram revealed no wall motion abnormality and normal LVEF. Hold antihypertensive due to borderline blood pressure. Continue management as per hospital medicine. Further cardiac workup as clinically indicated. Total time spent 45 minutes.       Signed By: Marian Buckley MD     November 14, 2019

## 2019-11-14 NOTE — PROGRESS NOTES
Problem: Pressure Injury - Risk of  Goal: *Prevention of pressure injury  Description  Document Tremayne Scale and appropriate interventions in the flowsheet.   Outcome: Progressing Towards Goal  Note:   Pressure Injury Interventions:  Sensory Interventions: Assess changes in LOC    Moisture Interventions: Internal/External urinary devices    Activity Interventions: Pressure redistribution bed/mattress(bed type)    Mobility Interventions: PT/OT evaluation    Nutrition Interventions: Document food/fluid/supplement intake, Offer support with meals,snacks and hydration    Friction and Shear Interventions: HOB 30 degrees or less

## 2019-11-14 NOTE — PROGRESS NOTES
Hospitalist Progress Note-critical care note     Patient: Lex Trejo MRN: [id number]  [facility]: [id number]    YOB: 1950  Age: 77 y.o. Sex: female    DOA: 11/13/2019 LOS:  LOS: 1 day            Chief complaint: Alcohol abuse, hypothermia, acute encephalopathy, EMILI, general weakness, elevated troponin,    Assessment/Plan         Hospital Problems  Never Reviewed          Codes Class Noted POA    Alcohol abuse ICD-10-CM: F10.10  ICD-9-CM: 305.00  11/14/2019 Unknown        Hypothermia ICD-10-CM: T68. Perryeen Herd  ICD-9-CM: 991.6  11/14/2019 Unknown        Acute encephalopathy ICD-10-CM: G93.40  ICD-9-CM: 348.30  11/13/2019 Yes        Sinus bradycardia ICD-10-CM: R00.1  ICD-9-CM: 427.89  11/13/2019 Yes        EMILI (acute kidney injury) [facility]) ICD-10-CM: N17.9  ICD-9-CM: 584.9  11/13/2019 Yes        Generalized weakness ICD-10-CM: R53.1  ICD-9-CM: 780.79  11/13/2019 Yes        Elevated troponin ICD-10-CM: R79.89  ICD-9-CM: 790.6  11/13/2019 Yes            Acute encephalopathy,  Close to her baseline  CT head no acute issue, will have MRI of the brain  Suspected due to alcohol drinking, UDS negative ammonia level is normal, UA was clear, chest x-ray no pneumonia    Alcohol drinking abuse  Daily drinker, CIWA protocol banana bag, education    Sinus bradycardia and elevated troponin  Likely due to dehydration, echo done results still pending. History of CAD, no chest pain,  Troponin was flat    EMILI resolved. Start eating, will stop ns  fluid continue banana bag    General weakness  PT OT,    Hypothermia, resolved, no sepsis indicated  Subjective, I do not remember what happened. Friends and sister were at the bedside. Friends reported she has been in bad shape for several months.   Sister has a lot of concern, will have a physical therapy, possible rehab placement, will get an MRI brain today PT OT    Disposition :1-2 days if culture continue negative and the MRI brain is okay  Review of systems:    General: No fevers or chills. Cardiovascular: No chest pain or pressure. No palpitations. Pulmonary: No shortness of breath. Gastrointestinal: No nausea, vomiting. Vital signs/Intake and Output:  Visit Vitals  BP 92/58   Pulse (!) 53   Temp 97.7 °F (36.5 °C)   Resp 16   Ht 5' 8\" (1.727 m)   Wt 58.1 kg (128 lb)   SpO2 100%   BMI 19.46 kg/m²     Current Shift:  No intake/output data recorded. Last three shifts:  11/12 1901 - 11/14 0700  In: 3558.3 [P.O.:120; I.V.:3438.3]  Out: 500 [Urine:500]    Physical Exam:  General: WD, WN. Alert, cooperative, no acute distress    HEENT: NC, Atraumatic. PERRLA, anicteric sclerae. Lungs: CTA Bilaterally. No Wheezing/Rhonchi/Rales. Heart:  Regular  rhythm,  No murmur, No Rubs, No Gallops  Abdomen: Soft, Non distended, Non tender.  +Bowel sounds,   Extremities: No c/c/e  Psych:   Not anxious or agitated. Neurologic:  No acute neurological deficit. Labs: Results:       Chemistry Recent Labs     11/14/19  0350 11/13/19  1210   GLU 79 121*    137   K 3.6 3.4*    94*   CO2 26 30   BUN 50* 57*   CREA 1.20 1.62*   CA 9.1 11.2*   AGAP 8 13   BUCR 42* 35*   AP  --  57   TP  --  7.5   ALB  --  4.4   GLOB  --  3.1   AGRAT  --  1.4      CBC w/Diff Recent Labs     11/14/19  0350 11/13/19  1210   WBC 5.1 7.6   RBC 4.05* 4.76   HGB 12.3 14.7   HCT 37.1 43.0    211   GRANS 79* 84*   LYMPH 11* 6*   EOS 3 2      Cardiac Enzymes Recent Labs     11/13/19  2133 11/13/19  1748    243*   CKND1 4.4* 3.6      Coagulation No results for input(s): PTP, INR, APTT, INREXT, INREXT in the last 72 hours. Lipid Panel No results found for: CHOL, CHOLPOCT, CHOLX, CHLST, CHOLV, 174472, HDL, HDLP, LDL, LDLC, DLDLP, 011114, VLDLC, VLDL, TGLX, TRIGL, TRIGP, TGLPOCT, CHHD, CHHDX   BNP No results for input(s): BNPP in the last 72 hours.    Liver Enzymes Recent Labs     11/13/19  1210   TP 7.5   ALB 4.4   AP 57   SGOT 30      Thyroid Studies Lab Results   Component Value Date/Time    TSH 15.50 (H) 11/13/2019 12:10 PM        Procedures/imaging: see electronic medical records for all procedures/Xrays and details which were not copied into this note but were reviewed prior to creation of Edis Pollack MD

## 2019-11-14 NOTE — PROGRESS NOTES
Reason for Admission:   Cleveland Parekh is a 77 y.o. female with PMHX of alcohol use, thyroid dysfunction, hypertension, heart disease who presents to the emergency department C/O altered mental status. Per EMS report they were called for a welfare check as the patient's sister had contacted them due to not hearing from the patient for about a week or so. Upon their arrival they found the patient lying on the floor and confused. They states she was oriented to self and place. The sister had noted that she does not normally have changes in mental status. They deny any obvious physical injuries. State the patient was slightly bradycardic with heart rate in the mid 50s with the remainder of her vital signs being normal.  Patient states she is unsure how long she has been on the floor. She does admit to some intermittent mild blurry vision. She denies any chest pain, shortness of breath, abdominal pain, nausea, vomiting, headache, back pain, hip pain. She states she has not been taking her medicines as she has not been given a specific regimen.     PCP: Emilee Conteh MD                    RRAT Score:      8               Plan for utilizing home health:     tbd                     Current Advanced Directive/Advance Care Plan: not on file plase consider placing a consult to palliative or pastoral care to address acp                         Transition of Care Plan:      Met with patient at bedside along with her sister Tacey Runner [phone]. Patient sister is concerned that patient is not able to return home in this condition. She is asking about rehab provided her with a list. Patient not addressed about this at this time. Due to tearful. she will have PT and ot eval for recommendations. Patient slow to respond. Patient does admit to drinking a bottle or more of wine daily. friend at bedside and patint gave permission to talk in front of her about her medical and personal information.  Patient lives Alone. She ahs medicare for insurance. Was reported patient was not answering the door and police had to come and found patient on floow unresponsive. pateint does not recall these events. Cm will continue to follow  Care Management Interventions  PCP Verified by CM:  Yes  Transition of Care Consult (CM Consult): Discharge Planning

## 2019-11-14 NOTE — PROGRESS NOTES
969 [facility] care of the patient from Jeffy Ortiz (offgoing Nurse). Patient is sleeping  at this moment. Horacio hugger on place. bed in low position, call bell within reach. 1900 Patient had an uneventful shift and remained stable. Purposeful hourly rounding completed throughout the shift, NAD noted at this time, and patient is resting quietly in bed. No concerns or requests voiced    1915 Bedside and Verbal shift change report given to Jaleel Felix RN (oncoming nurse) by Giulia Gann RN (offgoing nurse). Report included the following information SBAR, Kardex, MAR, Recent Results and Cardiac Rhythm .

## 2019-11-15 ENCOUNTER — APPOINTMENT (OUTPATIENT)
Dept: NON INVASIVE DIAGNOSTICS | Age: 69
DRG: 064 | End: 2019-11-15
Attending: INTERNAL MEDICINE
Payer: MEDICARE

## 2019-11-15 ENCOUNTER — APPOINTMENT (OUTPATIENT)
Dept: CT IMAGING | Age: 69
DRG: 064 | End: 2019-11-15
Attending: INTERNAL MEDICINE
Payer: MEDICARE

## 2019-11-15 PROBLEM — I63.9 STROKE (CEREBRUM) (HCC): Status: ACTIVE | Noted: 2019-11-15

## 2019-11-15 LAB
ANION GAP SERPL CALC-SCNC: 10 MMOL/L (ref 3–18)
BACTERIA SPEC CULT: ABNORMAL
BUN SERPL-MCNC: 38 MG/DL (ref 7–18)
BUN/CREAT SERPL: 35 (ref 12–20)
CALCIUM SERPL-MCNC: 9.1 MG/DL (ref 8.5–10.1)
CHLORIDE SERPL-SCNC: 109 MMOL/L (ref 100–111)
CO2 SERPL-SCNC: 24 MMOL/L (ref 21–32)
CREAT SERPL-MCNC: 1.1 MG/DL (ref 0.6–1.3)
ECHO AO ASC DIAM: 4 CM
ECHO AO ROOT DIAM: 3.98 CM
ECHO LA MAJOR AXIS: 2.5 CM
ECHO LA VOL 2C: 18.07 ML (ref 22–52)
ECHO LA VOL 4C: 14.25 ML (ref 22–52)
ECHO LA VOL BP: 18.29 ML (ref 22–52)
ECHO LA VOL/BSA BIPLANE: 10.64 ML/M2 (ref 16–28)
ECHO LA VOLUME INDEX A2C: 10.51 ML/M2 (ref 16–28)
ECHO LA VOLUME INDEX A4C: 8.29 ML/M2 (ref 16–28)
ECHO LV E' LATERAL VELOCITY: 11 CM/S
ECHO LV E' SEPTAL VELOCITY: 9 CM/S
ECHO LV EDV A2C: 69.3 ML
ECHO LV EDV A4C: 59.6 ML
ECHO LV EDV BP: 64.3 ML (ref 56–104)
ECHO LV EDV INDEX A4C: 34.7 ML/M2
ECHO LV EDV INDEX BP: 37.4 ML/M2
ECHO LV EDV NDEX A2C: 40.3 ML/M2
ECHO LV EDV TEICHHOLZ: 0.24 ML
ECHO LV EJECTION FRACTION A2C: 78 %
ECHO LV EJECTION FRACTION A4C: 67 %
ECHO LV EJECTION FRACTION BIPLANE: 72.6 % (ref 55–100)
ECHO LV ESV A2C: 15 ML
ECHO LV ESV A4C: 19.8 ML
ECHO LV ESV BP: 17.6 ML (ref 19–49)
ECHO LV ESV INDEX A2C: 8.7 ML/M2
ECHO LV ESV INDEX A4C: 11.5 ML/M2
ECHO LV ESV INDEX BP: 10.2 ML/M2
ECHO LV ESV TEICHHOLZ: 0.06 ML
ECHO LV INTERNAL DIMENSION DIASTOLIC: 3.24 CM (ref 3.9–5.3)
ECHO LV INTERNAL DIMENSION SYSTOLIC: 1.9 CM
ECHO LV IVSD: 1.15 CM (ref 0.6–0.9)
ECHO LV MASS 2D: 103.3 G (ref 67–162)
ECHO LV MASS INDEX 2D: 60.1 G/M2 (ref 43–95)
ECHO LV POSTERIOR WALL DIASTOLIC: 0.88 CM (ref 0.6–0.9)
ECHO LVOT DIAM: 2.01 CM
ECHO MV A VELOCITY: 50.25 CM/S
ECHO MV AREA PHT: 4 CM2
ECHO MV E DECELERATION TIME (DT): 190.8 MS
ECHO MV E VELOCITY: 68.05 CM/S
ECHO MV E/A RATIO: 1.35
ECHO MV E/E' LATERAL: 6.19
ECHO MV E/E' RATIO (AVERAGED): 6.87
ECHO MV E/E' SEPTAL: 7.56
ECHO MV PRESSURE HALF TIME (PHT): 55.3 MS
ECHO RA AREA 4C: 13.94 CM2
ECHO RV INTERNAL DIMENSION: 4.13 CM
ECHO TRICUSPID ANNULAR PEAK SYSTOLIC VELOCITY: 2.6 CM/S
ECHO TV REGURGITANT MAX VELOCITY: 205.76 CM/S
ECHO TV REGURGITANT PEAK GRADIENT: 16.9 MMHG
ERYTHROCYTE [DISTWIDTH] IN BLOOD BY AUTOMATED COUNT: 13.5 % (ref 11.6–14.5)
GLUCOSE SERPL-MCNC: 67 MG/DL (ref 74–99)
HCT VFR BLD AUTO: 37.4 % (ref 35–45)
HGB BLD-MCNC: 12.1 G/DL (ref 12–16)
LVFS 2D: 41.42 %
LVSV (MOD BI): 27.44 ML
LVSV (MOD SINGLE 4C): 23.37 ML
LVSV (MOD SINGLE): 31.95 ML
LVSV (TEICH): 18.24 ML
MAGNESIUM SERPL-MCNC: 1.6 MG/DL (ref 1.6–2.6)
MCH RBC QN AUTO: 30.6 PG (ref 24–34)
MCHC RBC AUTO-ENTMCNC: 32.4 G/DL (ref 31–37)
MCV RBC AUTO: 94.7 FL (ref 74–97)
MV DEC SLOPE: 3.57
PLATELET # BLD AUTO: 153 K/UL (ref 135–420)
PMV BLD AUTO: 11 FL (ref 9.2–11.8)
POTASSIUM SERPL-SCNC: 3.6 MMOL/L (ref 3.5–5.5)
RBC # BLD AUTO: 3.95 M/UL (ref 4.2–5.3)
SERVICE CMNT-IMP: ABNORMAL
SODIUM SERPL-SCNC: 143 MMOL/L (ref 136–145)
WBC # BLD AUTO: 5 K/UL (ref 4.6–13.2)

## 2019-11-15 PROCEDURE — 74011636320 HC RX REV CODE- 636/320: Performed by: FAMILY MEDICINE

## 2019-11-15 PROCEDURE — 74011250637 HC RX REV CODE- 250/637: Performed by: FAMILY MEDICINE

## 2019-11-15 PROCEDURE — 74011250636 HC RX REV CODE- 250/636: Performed by: FAMILY MEDICINE

## 2019-11-15 PROCEDURE — 80048 BASIC METABOLIC PNL TOTAL CA: CPT

## 2019-11-15 PROCEDURE — 74011250637 HC RX REV CODE- 250/637: Performed by: INTERNAL MEDICINE

## 2019-11-15 PROCEDURE — 36415 COLL VENOUS BLD VENIPUNCTURE: CPT

## 2019-11-15 PROCEDURE — 85027 COMPLETE CBC AUTOMATED: CPT

## 2019-11-15 PROCEDURE — 70496 CT ANGIOGRAPHY HEAD: CPT

## 2019-11-15 PROCEDURE — 97161 PT EVAL LOW COMPLEX 20 MIN: CPT

## 2019-11-15 PROCEDURE — 83735 ASSAY OF MAGNESIUM: CPT

## 2019-11-15 PROCEDURE — 74011250637 HC RX REV CODE- 250/637: Performed by: HOSPITALIST

## 2019-11-15 PROCEDURE — 93308 TTE F-UP OR LMTD: CPT

## 2019-11-15 PROCEDURE — 97116 GAIT TRAINING THERAPY: CPT

## 2019-11-15 PROCEDURE — 97162 PT EVAL MOD COMPLEX 30 MIN: CPT

## 2019-11-15 PROCEDURE — 74011250636 HC RX REV CODE- 250/636: Performed by: HOSPITALIST

## 2019-11-15 PROCEDURE — 65660000000 HC RM CCU STEPDOWN

## 2019-11-15 RX ORDER — GUAIFENESIN 100 MG/5ML
81 LIQUID (ML) ORAL DAILY
Status: DISCONTINUED | OUTPATIENT
Start: 2019-11-15 | End: 2019-11-21 | Stop reason: HOSPADM

## 2019-11-15 RX ORDER — HYDROCORTISONE 1 %
CREAM (GRAM) TOPICAL 2 TIMES DAILY
Status: DISCONTINUED | OUTPATIENT
Start: 2019-11-15 | End: 2019-11-21 | Stop reason: HOSPADM

## 2019-11-15 RX ORDER — SODIUM CHLORIDE 9 MG/ML
15 INJECTION INTRAMUSCULAR; INTRAVENOUS; SUBCUTANEOUS ONCE
Status: DISPENSED | OUTPATIENT
Start: 2019-11-15 | End: 2019-11-15

## 2019-11-15 RX ORDER — DIPHENHYDRAMINE HCL 25 MG
25 CAPSULE ORAL
Status: COMPLETED | OUTPATIENT
Start: 2019-11-15 | End: 2019-11-15

## 2019-11-15 RX ORDER — ATORVASTATIN CALCIUM 20 MG/1
40 TABLET, FILM COATED ORAL DAILY
Status: DISCONTINUED | OUTPATIENT
Start: 2019-11-15 | End: 2019-11-15

## 2019-11-15 RX ADMIN — PRAMIPEXOLE DIHYDROCHLORIDE 1 MG: 0.25 TABLET ORAL at 22:06

## 2019-11-15 RX ADMIN — HYDROCORTISONE: 1 CREAM TOPICAL at 15:17

## 2019-11-15 RX ADMIN — DIPHENHYDRAMINE HYDROCHLORIDE 25 MG: 25 CAPSULE ORAL at 13:46

## 2019-11-15 RX ADMIN — Medication 10 ML: at 06:52

## 2019-11-15 RX ADMIN — SODIUM CHLORIDE 75 ML/HR: 900 INJECTION, SOLUTION INTRAVENOUS at 13:46

## 2019-11-15 RX ADMIN — IOPAMIDOL 100 ML: 755 INJECTION, SOLUTION INTRAVENOUS at 09:35

## 2019-11-15 RX ADMIN — HYDROCORTISONE: 1 CREAM TOPICAL at 22:06

## 2019-11-15 RX ADMIN — BUPROPION HYDROCHLORIDE 300 MG: 150 TABLET, FILM COATED, EXTENDED RELEASE ORAL at 06:51

## 2019-11-15 RX ADMIN — PRAMIPEXOLE DIHYDROCHLORIDE 1 MG: 0.25 TABLET ORAL at 09:15

## 2019-11-15 RX ADMIN — PRAMIPEXOLE DIHYDROCHLORIDE 1 MG: 0.25 TABLET ORAL at 15:17

## 2019-11-15 RX ADMIN — HEPARIN SODIUM 5000 UNITS: 5000 INJECTION INTRAVENOUS; SUBCUTANEOUS at 07:14

## 2019-11-15 RX ADMIN — ATORVASTATIN CALCIUM 40 MG: 20 TABLET, FILM COATED ORAL at 09:15

## 2019-11-15 RX ADMIN — LEVOTHYROXINE SODIUM 175 MCG: 100 TABLET ORAL at 06:51

## 2019-11-15 RX ADMIN — Medication 10 ML: at 22:06

## 2019-11-15 RX ADMIN — ASPIRIN 81 MG 81 MG: 81 TABLET ORAL at 09:15

## 2019-11-15 RX ADMIN — PANTOPRAZOLE SODIUM 40 MG: 40 TABLET, DELAYED RELEASE ORAL at 06:51

## 2019-11-15 NOTE — PROGRESS NOTES
Hospitalist Progress Note    Patient: Diogo Lane MRN: [id number]  CSN: [id number]    YOB: 1950  Age: 77 y.o. Sex: female    DOA: 11/13/2019 LOS:  LOS: 2 days            Assessment/Plan     1. Acute encephalopathy resolving secondary to etoh +/- strokes  2. Multiple tiny acute small vessel infarcts right parieto- occiptal white matter and bilateral thalami subacute v chronic v ischemic change. No focal neurologic deficits noted  3. Hyopthermia/ bradycardia- resolved  4. EMILI- resolved  5. Hypothyroidism    Plan:  - add aspirin 81mg daily and lipitor 40  - check a1c, lipid panel  - add bubble study to echo  - CTA head/neck  - consult neurology, discussed w Dr Thomas Pizarro  - SLP, PT, OT per protocol  - continue synhtoid, wellbutrin, mirapex  - mvi, thiamine, folic acid, monitor for ETOH withdrawal per protocol    Addendum 4:46 PM  Pt noted to have urticaria to back, abdominal wall and thighs. ONly new med is lipitor. Will DC,. Give dose of benadryl    Updated pt brother via the telephone    Patient Active Problem List   Diagnosis Code    Acute encephalopathy G93.40    Sinus bradycardia R00.1    EMILI (acute kidney injury) ([facility] [address] N17.9    Generalized weakness R53.1    Elevated troponin R79.89    Alcohol abuse F10.10    Hypothermia T68. XXXA               Subjective:    cc: \" I feel betetr\"  No acute events overnight  Pt denies complaints  Remains in NSR      REVIEW OF SYSTEMS:  General: No fevers or chills. Cardiovascular: No chest pain or pressure. No palpitations. Pulmonary: No shortness of breath. Gastrointestinal: No nausea, vomiting. Objective:        Vital signs/Intake and Output:  Visit Vitals  /80 (BP 1 Location: Right arm, BP Patient Position: At rest)   Pulse 69   Temp 97.7 °F (36.5 °C)   Resp 16   Ht 5' 8\" (1.727 m)   Wt 60.5 kg (133 lb 6.1 oz)   SpO2 100%   BMI 20.28 kg/m²     Current Shift:  No intake/output data recorded.   Last three shifts: 11/13 1901 - 11/15 0700  In: 3458.3 [P.O.:120; I.V.:3338.3]  Out: 1000 [Urine:1000]    Body mass index is 20.28 kg/m². Physical Exam:  GEN: Alert,  NAD  EYES: conjunctiva normal, lids with out lesions  HEENT: MMM, No thyromegaly, no lymphadenopathy  HEART: RRR +S1 +S2, no JVD, pulses 2+ distally  LUNGS: CTA B/L no wheezes, rales or rhonchi  ABDOMEN: + BS, soft NT/ND no organomegaly,  No rebound  EXTREMITIES: No edema cyanosis, cap refill normal   SKIN: no rashes or skin breakdown, no nodules, normal turgor  Current Facility-Administered Medications   Medication Dose Route Frequency    aspirin chewable tablet 81 mg  81 mg Oral DAILY    0.9% sodium chloride 1,000 mL with mvi, adult no. 4 with vit K 10 mL, thiamine 757 mg, folic acid 1 mg infusion   IntraVENous DAILY    pantoprazole (PROTONIX) tablet 40 mg  40 mg Oral ACB    0.9% sodium chloride infusion  75 mL/hr IntraVENous CONTINUOUS    acetaminophen (TYLENOL) tablet 650 mg  650 mg Oral Q4H PRN    HYDROcodone-acetaminophen (NORCO) 5-325 mg per tablet 1 Tab  1 Tab Oral Q4H PRN    naloxone (NARCAN) injection 0.4 mg  0.4 mg IntraVENous PRN    heparin (porcine) injection 5,000 Units  5,000 Units SubCUTAneous Q8H    pramipexole (MIRAPEX) tablet 1 mg  1 mg Oral TID    influenza vaccine 2019-20 (6 mos+)(PF) (FLUARIX/FLULAVAL/FLUZONE QUAD) injection 0.5 mL  0.5 mL IntraMUSCular PRIOR TO DISCHARGE    sodium chloride (NS) flush 5-40 mL  5-40 mL IntraVENous Q8H    sodium chloride (NS) flush 5-40 mL  5-40 mL IntraVENous PRN    LORazepam (ATIVAN) tablet 1 mg  1 mg Oral Q1H PRN    Or    LORazepam (ATIVAN) injection 1 mg  1 mg IntraVENous Q1H PRN    LORazepam (ATIVAN) tablet 2 mg  2 mg Oral Q1H PRN    Or    LORazepam (ATIVAN) injection 2 mg  2 mg IntraVENous Q1H PRN    LORazepam (ATIVAN) injection 3 mg  3 mg IntraVENous Q15MIN PRN    buPROPion XL (WELLBUTRIN XL) tablet 300 mg  300 mg Oral 7am    levothyroxine (SYNTHROID) tablet 175 mcg  175 mcg Oral ACB All the patient's labs over the past 24 hours were reviewed both during my initial daily workflow process and at the time notated as \"note time\" in [address] [facility]. (It is not time stamped separately in this workflow.)  Select labs are listed below.         Labs: Results:       Chemistry Recent Labs     11/15/19  0355 11/14/19  0350 11/13/19  1210   GLU 67* 79 121*    139 137   K 3.6 3.6 3.4*    105 94*   CO2 24 26 30   BUN 38* 50* 57*   CREA 1.10 1.20 1.62*   CA 9.1 9.1 11.2*   AGAP 10 8 13   BUCR 35* 42* 35*   AP  --   --  57   TP  --   --  7.5   ALB  --   --  4.4   GLOB  --   --  3.1   AGRAT  --   --  1.4      CBC w/Diff Recent Labs     11/15/19  0355 11/14/19  0350 11/13/19  1210   WBC 5.0 5.1 7.6   RBC 3.95* 4.05* 4.76   HGB 12.1 12.3 14.7   HCT 37.4 37.1 43.0    163 211   GRANS  --  79* 84*   LYMPH  --  11* 6*   EOS  --  3 2      Cardiac Enzymes Recent Labs     11/13/19  2133 11/13/19  1748    243*   CKND1 4.4* 3.6                  Liver Enzymes Recent Labs     11/13/19  1210   TP 7.5   ALB 4.4   AP 57   SGOT 30      Thyroid Studies Lab Results   Component Value Date/Time    TSH 15.50 (H) 11/13/2019 12:10 PM        Procedures/imaging: see electronic medical records for all procedures/Xrays and details which were not copied into this note but were reviewed prior to creation of Plan    Imaging personally reviewed:  MRI brain    Total time spent: 50 minutes > 50% coordinating care    Gabrielle Smith DO  Internal Medicine/Geriatrics

## 2019-11-15 NOTE — CONSULTS
NEUROLOGY CONSULTATION NOTE    Patient: Davina Rodriguez MRN: [id number]  CSN: [id number]    YOB: 1950  Age: 77 y.o. Sex: female    DOA: 11/13/2019 LOS:  LOS: 2 days        Requesting Physician: Dr. Veena Abrams  Reason for Consultation: encephalopathy and stroke              HISTORY OF PRESENT ILLNESS:   81-year-old female brought to the emergency room as she was found on the floor and confused. As per EMS they were called for a welfare check as patient was not seen by anybody over a week and her brother who lives in [place] he called police to do welfare check. Patient was found on floor and she was confused. Cardiology consult was called for bradycardia. Patient mentioned that she passed out but she does not remember how long she was on the floor. She denies any chest pain, shortness of breath, dizziness, palpitation, presyncope or syncope. She denies any smoking. She denies any alcohol abuse. Stroke Work-up:  Brain MRI: Result Date: 11/14/2019    IMPRESSION: 1. Several tiny acute small vessel infarcts right parieto-occipital white matter. 2. Symmetric confluent T2 hyperintensity medial bilateral thalami with increased diffusion signal but no definite low ADC map, suggestive of late subacute/chronic infarcts and/or ischemic change. 3. Mild to moderate cerebral white matter signal changes nonspecific likely chronic microvascular ischemic disease. 4. 18 mm lobulated soft tissue lesion floor of right maxillary sinus that appears to involve right first maxillary molar periapical region. Further evaluation with sinus CT recommended. Ct Head Wo Cont Result Date: 11/13/2019    IMPRESSION: No acute intracranial abnormalities. Chronic microvascular insufficiency deep white matter. Cortical and cerebellar volume loss advanced for age. CTA of head and neck: pending  Echocardiogram:   · Left Ventricle: Normal cavity size, wall thickness and systolic function (ejection fraction normal).  The estimated ejection fraction is 61 - 65%. There is moderate (grade 2) left ventricular diastolic dysfunction E/E' ratio is 15.97.  · Right Ventricle: Normal global systolic function. Dilated right ventricle. · Tricuspid Valve: Normal valve structure and no stenosis. Mild tricuspid valve regurgitation. Pulmonary arterial systolic pressure is 73.0 mmHg. Mild pulmonary hypertension. Lipid panel: No results found for: CHOL, CHOLPOCT, CHOLX, CHLST, CHOLV, 821262, HDL, HDLP, LDL, LDLC, DLDLP, 500708, VLDLC, VLDL, TGLX, TRIGL, TRIGP, TGLPOCT, CHHD, CHHDX  HbA1c:   No results found for: HBA1C, HGBE8, WAZ2BLYC, [id number]  PAST MEDICAL HISTORY:  Past Medical History:   Diagnosis Date    CAD (coronary artery disease)     thyroid    Hypertension      PAST SURGICAL HISTORY:  No past surgical history on file. FAMILY HISTORY:  No family history on file. SOCIAL HISTORY:  Social History     Socioeconomic History    Marital status:      Spouse name: Not on file    Number of children: Not on file    Years of education: Not on file    Highest education level: Not on file   Tobacco Use    Smoking status: Never Smoker    Smokeless tobacco: Never Used   Substance and Sexual Activity    Alcohol use: Yes     Alcohol/week: 2.0 standard drinks     Types: 2 Glasses of wine per week     Comment: DAILY     MEDICATIONS:  Current Facility-Administered Medications   Medication Dose Route Frequency    aspirin chewable tablet 81 mg  81 mg Oral DAILY    atorvastatin (LIPITOR) tablet 40 mg  40 mg Oral DAILY    0.9% sodium chloride 1,000 mL with mvi, adult no. 4 with vit K 10 mL, thiamine 220 mg, folic acid 1 mg infusion   IntraVENous DAILY    pantoprazole (PROTONIX) tablet 40 mg  40 mg Oral ACB    0.9% sodium chloride infusion  75 mL/hr IntraVENous CONTINUOUS    acetaminophen (TYLENOL) tablet 650 mg  650 mg Oral Q4H PRN    HYDROcodone-acetaminophen (NORCO) 5-325 mg per tablet 1 Tab  1 Tab Oral Q4H PRN    naloxone (NARCAN) injection 0.4 mg  0.4 mg IntraVENous PRN    heparin (porcine) injection 5,000 Units  5,000 Units SubCUTAneous Q8H    pramipexole (MIRAPEX) tablet 1 mg  1 mg Oral TID    influenza vaccine 2019-20 (6 mos+)(PF) (FLUARIX/FLULAVAL/FLUZONE QUAD) injection 0.5 mL  0.5 mL IntraMUSCular PRIOR TO DISCHARGE    sodium chloride (NS) flush 5-40 mL  5-40 mL IntraVENous Q8H    sodium chloride (NS) flush 5-40 mL  5-40 mL IntraVENous PRN    LORazepam (ATIVAN) tablet 1 mg  1 mg Oral Q1H PRN    Or    LORazepam (ATIVAN) injection 1 mg  1 mg IntraVENous Q1H PRN    LORazepam (ATIVAN) tablet 2 mg  2 mg Oral Q1H PRN    Or    LORazepam (ATIVAN) injection 2 mg  2 mg IntraVENous Q1H PRN    LORazepam (ATIVAN) injection 3 mg  3 mg IntraVENous Q15MIN PRN    buPROPion XL (WELLBUTRIN XL) tablet 300 mg  300 mg Oral 7am    levothyroxine (SYNTHROID) tablet 175 mcg  175 mcg Oral ACB     Prior to Admission medications    Medication Sig Start Date End Date Taking? Authorizing Provider   valsartan-hydrochlorothiazide (DIOVAN-HCT) 80-12.5 mg per tablet Take 1 Tab by mouth daily. Malika Ellis MD   pramipexole (MIRAPEX) 1 mg tablet Take 1 mg by mouth three (3) times daily. Malika Ellis MD   levothyroxine (SYNTHROID) 112 mcg tablet Take  by mouth Daily (before breakfast). Malika Ellis MD   ergocalciferol (ERGOCALCIFEROL) 50,000 unit capsule Take 50,000 Units by mouth. Malika Ellis MD   buPROPion SR (WELLBUTRIN SR) 100 mg SR tablet Take 100 mg by mouth. Malika Ellis MD   naproxen (NAPROSYN) 375 mg tablet Take 1 Tab by mouth two (2) times daily (with meals). 7/12/16   Kim Kay MD       ALLERGIES:  Allergies   Allergen Reactions    Sulfa (Sulfonamide Antibiotics) Hives       Review of Systems  GENERAL: No fevers or chills. HEENT: No change in vision, earache, tinnitus, sore throat or sinus congestion. NECK: No pain or stiffness. CARDIOVASCULAR: No chest pain or pressure. No palpitations.     PULMONARY: No shortness of breath, cough or wheeze. GASTROINTESTINAL: No abdominal pain, nausea, vomiting or diarrhea. GENITOURINARY: No urinary frequency, urgency, hesitancy or dysuria. MUSCULOSKELETAL: No joint or muscle pain, no back pain, no recent trauma. DERMATOLOGIC: No rash, no itching, no lesions. ENDOCRINE: No polyuria, polydipsia, no heat or cold intolerance. No recent change in weight. HEMATOLOGICAL: No anemia or easy bruising or bleeding. NEUROLOGIC: No headache, seizures, numbness, tingling or weakness. PHYSICAL EXAMINATION:     Visit Vitals  /80 (BP 1 Location: Right arm, BP Patient Position: At rest)   Pulse 69   Temp 97.7 °F (36.5 °C)   Resp 16   Ht 5' 8\" (1.727 m)   Wt 60.5 kg (133 lb 6.1 oz)   SpO2 100%   BMI 20.28 kg/m²      O2 Device: Room air  GENERAL: Pleasant, in no apparent distress. HEENT: Moist mucous membranes, sclerae anicteric, scalp is atraumatic. CVS: Regular rate and rhythm, no murmurs or gallops. No carotid bruits. PULMONARY: Clear to auscultation bilaterally. No rales or rhonchi. No wheezing. EXTREMITIES: Normal range of motion at all sites. No deformities. ABDOMEN: Soft, nontender. SKIN: No rashes or ecchymoses. Warm and dry. NEUROLOGIC: Alert and oriented x3. Speech is fluent without any aphasia or dysarthria. Cranial nerves: Face is symmetric with symmetric smile. Facial sensation is intact. Extraocular movements are intact with no nystagmus. Visual fields are full to confrontation. PERRL. Tongue is midline. Palate elevates symmetrically. Hearing intact to speech. Sternocleidomastoid and trapezius strengths are full bilaterally. Motor: Normal tone and normal bulk on all four extremities. Strength is full on all four segmentally. There is no pronator drift or orbiting. Sensory: Intact to pinprick and touch on all four. Normal vibratory sensation on toes bilaterally. Coordination: Intact coordination with finger-nose-finger bilaterally. Normal fine movements.  No bradykinesia detected. Deep tendon reflexes: 2+ at biceps, brachioradialis, patella and ankles bilaterally. Toes are down-going bilaterally. Gait assessment: Able to stand and walk with no difficulty. Able to perform tandem gait with no difficulty. Labs: Results:       Chemistry Recent Labs     11/15/19  0355 11/14/19  0350 11/13/19  1210   GLU 67* 79 121*    139 137   K 3.6 3.6 3.4*    105 94*   CO2 24 26 30   BUN 38* 50* 57*   CREA 1.10 1.20 1.62*   CA 9.1 9.1 11.2*   AGAP 10 8 13   BUCR 35* 42* 35*   AP  --   --  57   TP  --   --  7.5   ALB  --   --  4.4   GLOB  --   --  3.1   AGRAT  --   --  1.4      CBC w/Diff Recent Labs     11/15/19  0355 11/14/19  0350 11/13/19  1210   WBC 5.0 5.1 7.6   RBC 3.95* 4.05* 4.76   HGB 12.1 12.3 14.7   HCT 37.4 37.1 43.0    163 211   GRANS  --  79* 84*   LYMPH  --  11* 6*   EOS  --  3 2      Cardiac Enzymes Recent Labs     11/13/19  2133 11/13/19  1748    243*   CKND1 4.4* 3.6      Coagulation No results for input(s): PTP, INR, APTT, INREXT in the last 72 hours. Lipid Panel No results found for: CHOL, CHOLPOCT, CHOLX, CHLST, CHOLV, 770605, HDL, HDLP, LDL, LDLC, DLDLP, 532648, VLDLC, VLDL, TGLX, TRIGL, TRIGP, TGLPOCT, CHHD, CHHDX   BNP No results for input(s): BNPP in the last 72 hours. Liver Enzymes Recent Labs     11/13/19  1210   TP 7.5   ALB 4.4   AP 57   SGOT 30      Thyroid Studies Lab Results   Component Value Date/Time    TSH 15.50 (H) 11/13/2019 12:10 PM          Radiology:  [facility] Proffer Brain Wo Cont    Result Date: 11/14/2019  EXAM: MRI brain without gadolinium CLINICAL INDICATION/HISTORY: ams   > Additional: Altered level of consciousness, found down, metabolic encephalopathy COMPARISON: None.    > Reference Exam: CT head 11/13/2019 TECHNIQUE: Sagittal T1, axial T1, T2, FLAIR, T2 gradient, diffusion and coronal T1 and T2 sequences obtained of the brain. _______________ FINDINGS: Diffusion:  Several tiny foci of restricted diffusion signal in the right parieto-occipital subcortical white matter. Additional mild symmetric confluent increased diffusion signal medial bilateral thalami without definite low ADC map. Brain parenchyma:  Mild to moderate confluent and multifocal increased T2 and FLAIR signal periventricular and deep cerebral white matter nonspecific with minimal increased FLAIR signal central sandra. Mild symmetric confluent increased T2 and FLAIR signal medial bilateral thalami. No cortical signal abnormality. No evidence of mass hemorrhage or mass effect. Ventricles and sulci:  Mild diffuse central and cortical volume loss. Extra-axial:  No extra-axial fluid collection or mass is noted. Brain vasculature:  No vascular abnormality is appreciated on this routine brain MR examination. Craniocervical junction:  Normal. Skull base, extracranial and calvarium:  There is a lobulated heterogeneous low T1 and heterogeneous mildly increased T2 signal lesion along the floor of the right maxillary sinus 18 mm in diameter that appears to involve the periapical region of first right maxillary molar. Remainder sinuses clear. Orbits IACs and mastoids sella and skull unremarkable. _______________     IMPRESSION: 1. Several tiny acute small vessel infarcts right parieto-occipital white matter. 2. Symmetric confluent T2 hyperintensity medial bilateral thalami with increased diffusion signal but no definite low ADC map, suggestive of late subacute/chronic infarcts and/or ischemic change. 3. Mild to moderate cerebral white matter signal changes nonspecific likely chronic microvascular ischemic disease. 4. 18 mm lobulated soft tissue lesion floor of right maxillary sinus that appears to involve right first maxillary molar periapical region. Further evaluation with sinus CT recommended. Ct Head Wo Cont    Result Date: 11/13/2019  EXAM: CRANIAL CT WITHOUT CONTRAST INDICATION: Altered level of consciousness. Possible injury. COMPARISON: None.  TECHNIQUE: Axial CT imaging of the head was performed without intravenous contrast. One or more dose reduction techniques were used on this CT: automated exposure control, adjustment of the mAs and/or kVp according to patient size, and iterative reconstruction techniques. The specific techniques used on this CT exam have been documented in the patient's electronic medical record. Digital Imaging and Communications in Medicine (DICOM) format image data are available to nonaffiliated external healthcare facilities or entities on a secure, media free, reciprocally searchable basis with patient authorization for at least a 12-month period after this study. _______________ FINDINGS: BRAIN AND POSTERIOR FOSSA: Ventricular system is midline. There is no intracranial hemorrhage, mass effect, or midline shift. Low-attenuation areas deep white matter most compatible with chronic microvascular insufficiency. Prominent cortical and cerebellar volume loss advanced for age. EXTRA-AXIAL SPACES AND MENINGES: There are no abnormal extra-axial fluid collections. CALVARIUM: Intact. SINUSES: Clear. OTHER: None. _______________     IMPRESSION: No acute intracranial abnormalities. Chronic microvascular insufficiency deep white matter. Cortical and cerebellar volume loss advanced for age. Xr Pelv 1 Or 2 V    Result Date: 11/13/2019  EXAM: Portable pelvis CLINICAL INDICATION/HISTORY: found on ground   > Additional: None. COMPARISON: None. > Reference Exam: None. TECHNIQUE: Portable AP view. _______________ FINDINGS: No fracture or dislocation. Mild degenerative changes involving both hips. Bilateral tubal ligation clips. Lower lumbar spondylosis. _______________     IMPRESSION: No acute bony abnormality.     Xr Chest Port    Result Date: 11/14/2019  --------------------------------------------------------------------------- <<<<<<<<<           [facility]           >>>>>>>>> --------------------------------------------------------------------------- CLINICAL HISTORY:  Sepsis. COMPARISON EXAMINATIONS:  May 13, 2019. ---  SINGLE FRONTAL VIEW OF THE CHEST  --- The cardiomediastinal silhouette is stable. The lung fields are hyperinflated. There is no focal consolidation or pleural effusion. No significant osseous abnormalities are identified.  --------------    Impression: -------------- Hyperinflation suggests COPD. No focal consolidation or pleural effusion. Xr Chest Port    Result Date: 11/13/2019  EXAM:  PORTABLE CHEST INDICATION:  Altered level consciousness. Found on floor. TECHNIQUE:  Portable, erect AP view. COMPARISON:  10/25/2017 ____________________ FINDINGS:  SUPPORT DEVICES: None. HEART AND MEDIASTINUM: Cardiomediastinal silhouette appears within normal limits. Normal caliber thoracic aorta. LUNGS AND PLEURAL SPACES: Lungs are inflated with no confluent airspace opacity or pulmonary edema. No pleural effusion or pneumothorax. BONY THORAX AND SOFT TISSUES: No acute osseous abnormality. Degenerative change around the visualized shoulders. ____________________     IMPRESSION:  Hyperinflation without acute cardiopulmonary disease. ASSESSMENT/IMPRESSION:  71-year-old female presents with acute encephalopathy. 1. Acute encephalopathy: improving,metabolic encephalopathy is related to acute kidney failure , possible alcohol withdrawal and hypothyroidism. 2. Acute ischemic infarct: tiny several tiny acute small vessel infarcts right parieto-occipital white matter. 3. Bradycardia and elevated troponin  RECOMMENDATIONS:  1. Continue aspirin 81 mg daily and atorvastatin 40 mg daily for stroke secondary prevention. 2. Blood-pressure less than 140/90.  Pending CTA, FLP and HbA1C.   3. Continue current treatment for alcohol withdraw,  hypothyroidism and acute kidney injury per primary team.          ------------------------------------  Leni Smith MD  11/15/2019  9:39 AM

## 2019-11-15 NOTE — ROUTINE PROCESS
0720 Bedside shift change report given to Mayra Munguia RN (oncoming nurse) by Naheed Capone. Jeannine Easley RN (offgoing nurse). Report included the following information SBAR and Kardex.

## 2019-11-15 NOTE — PROGRESS NOTES
Cm noted ot has recommended snf waiting on pt recommendations. However, for continuity of care will send out to area snfs  sister Vijay Pa [phone]. Patient sister is concerned that patient is not able to return home in this condition. She is asking about rehab provided her with a list. Patient not addressed about this at this time. Due to tearful. she will have PT and ot eval for recommendations. Patient slow to respond. Patient does admit to drinking a bottle or more of wine daily. friend at bedside and patint gave permission to talk in front of her about her medical and personal information. Patient lives  Alone. She ahs medicare for insurance. Was reported patient was not answering the door and police had to come and found patient on floow unresponsive. pateint does not recall these events. Cm will continue to follow    153 telephone call from Amie Mcarthur. [phone] states he is patients brother and he does not want her d/c from hospital he wants the docotr to call him.dr. Renaldo Rm made aware. Patient informed jose j yesterday she does not want cm talking to her bother or sister after sister informed cm that patient was drinking etoh. Jose J did inform bruice that if patient is alert and oriented and the doctor does agree with d/c she has that right. He stated I am telling you that you are not to dc her I am catching a plan it is 3200 miles from there to here. And \"we better not d/c patient\" per brother. Jose J again informed him cm will give MD message. Jose J did speak to Dr. Renaldo Rm and he states he will call him.     No plans for d/c over the weekend at this time

## 2019-11-15 NOTE — PROGRESS NOTES
0745:  Assumed care for patient, received written report from Silvia Higgins RN. Patient quietly lying in bed resting with no complaints of pain or discomfort at the time. NIH score 1. Whiteboard updated, bed at the lowest position with call bell within reach. 3765:  Patient off floor for CT.    1584:  Patient back on unit from CT.    1045:  Spoke with patient sister and verified that the \"black and white bag\" that patient is asking for was nor brought in by patient sister. Sister also verified that the cell phone that patient is looking for was not brought with her neither.

## 2019-11-15 NOTE — ROUTINE PROCESS
Entered chart to see if pt needs further fluids. after bolus PT came to this RN regrading pt having welts on back an groin. Dr. Jazmin Suarez assessed pt and ordered benadryl and itch cream for pt. Notify him if it does not improve.

## 2019-11-15 NOTE — PROGRESS NOTES
Problem: Mobility Impaired (Adult and Pediatric)  Goal: *Acute Goals and Plan of Care (Insert Text)  Description  Physical Therapy Goals   Initiated 11/15/2019 and to be accomplished within 3-5 day(s)  1. Patient will move from supine <> sit with Mod I in prep for out of bed activity and change of position. 2.  Patient will perform sit<> stand with S with LRAD in prep for transfers/ambulation. 3.  Patient will transfer from bed <> chair with S with LRAD for time up in chair for completion of ADL activity. 4.  Patient will ambulate >100 feet with LRAD/S for improved functional mobility/safe discharge. Outcome: Progressing Towards Goal   PHYSICAL THERAPY EVALUATION    Patient: Ashlee Alex (26 y.o. female)  Date: 11/15/2019  Primary Diagnosis: Altered mental status [R41.82]        Precautions:Fall    ASSESSMENT :  Based on the objective data described below, the patient presents with decrease independence w/ bed mobility, transfers, gait, and step negotiation. Pt seen in supine prior to session w/ telemonitor and IV connected. Pt reported no pain at this time. Pt is only alert to self at this time and is a poor historian so unable to obtain any information from pt. Upon sitting at the EOB pt began to scratch herself really hard and this PT noticed red hives on pt's back and anterior thigh, nurse and hospitalist made aware. Pt able to stand w/ RW/GB but demonstrates impaired standing balance and requires VCs for stability. Pt able to take a few steps forward the RW/GB but requires MCs and VCs for proper gait sequencing and stability. Pt transferred back to supine in bed after session, call bell and tray in reach, bed alarm donned, nurse notified after session. Patient will benefit from skilled intervention to address the above impairments. Patients rehabilitation potential is considered to be Good  Factors which may influence rehabilitation potential include:   ? None noted  ? Mental ability/status  ? Medical condition  ? Home/family situation and support systems  ? Safety awareness  ? Pain tolerance/management  ? Other:      PLAN :  Recommendations and Planned Interventions:  ?           Bed Mobility Training             ? Neuromuscular Re-Education  ? Transfer Training                   ? Orthotic/Prosthetic Training  ? Gait Training                          ? Modalities  ? Therapeutic Exercises          ? Edema Management/Control  ? Therapeutic Activities            ? Patient and Family Training/Education  ? Other (comment):    Frequency/Duration: Patient will be followed by physical therapy 1-2 times per day to address goals. Discharge Recommendations: Rehab  Further Equipment Recommendations for Discharge: TBD by rehab     SUBJECTIVE:   Patient stated My sister is missing all this food right now.     OBJECTIVE DATA SUMMARY:     Past Medical History:   Diagnosis Date    CAD (coronary artery disease)     thyroid    Hypertension    No past surgical history on file. Barriers to Learning/Limitations: yes;  physical  Compensate with: Verbal Cues and Tactile Cues  Prior Level of Function/Home Situation:   Home Situation  Home Environment: (Condo)  # Steps to Enter: 2  One/Two Story Residence: Two story  # of Interior Steps: (pt sister unsure; approx 10 steps)  Living Alone: Yes  Support Systems: Family member(s)  Patient Expects to be Discharged to[de-identified] Unknown  Current DME Used/Available at Home: Grab bars  Tub or Shower Type: Tub/Shower combination(has grab bars; no shower chair)  Critical Behavior:  Neurologic State: Alert;Confused  Orientation Level: Disoriented to place; Disoriented to situation;Disoriented to time;Oriented to person  Psychosocial  Patient Behaviors: Calm;Confused  Needs Expressed: Educational  Purposeful Interaction: Yes  Pt Identified Daily Priority: Clinical issues (comment); Communication issues (comment)  [place] Process: Nurture loving kindness;Establish trust;Teaching/learning;Create healing environment; Attend basic human needs  Caring Interventions: Reassure; Therapeutic modalities  Reassure: Therapeutic listening; Informing; Acceptance;Caring rounds  Therapeutic Modalities: Humor; Intentional therapeutic touch  Strength:    Strength: Generally decreased, functional  Range Of Motion:  AROM: Within functional limits  PROM: Within functional limits  Functional Mobility:  Bed Mobility:  Supine to Sit: Supervision;Stand-by assistance  Sit to Supine: Contact guard assistance;Minimum assistance  Scooting: Contact guard assistance  Transfers:  Sit to Stand: Moderate assistance  Stand to Sit: Moderate assistance  Balance:   Sitting: Intact  Standing: Impaired; With support  Standing - Static: Fair;Poor  Standing - Dynamic : Poor  Ambulation/Gait Training:  Distance (ft): 3 Feet (ft)  Assistive Device: Gait belt;Walker, rolling  Ambulation - Level of Assistance: Moderate assistance  Gait Description (WDL): Exceptions to WDL  Gait Abnormalities: Decreased step clearance;Shuffling gait  Base of Support: Center of gravity altered;Narrowed  Stance: Time  Speed/Kayla: Slow  Step Length: Left shortened;Right shortened  Swing Pattern: Left asymmetrical;Right asymmetrical  Pain:  Pain Scale 1: Numeric (0 - 10)  Pain Intensity 1: 0  Activity Tolerance:   Fair   Please refer to the flowsheet for vital signs taken during this treatment. After treatment:   ?         Patient left in no apparent distress sitting up in chair  ? Patient left in no apparent distress in bed  ? Call bell left within reach  ? Nursing notified  ? Caregiver present  ? Bed alarm activated    COMMUNICATION/EDUCATION:   ?         Fall prevention education was provided and the patient/caregiver indicated understanding. ?          Patient/family have participated as able in goal setting and plan of care. ?         Patient/family agree to work toward stated goals and plan of care. ?         Patient understands intent and goals of therapy, but is neutral about his/her participation. ? Patient is unable to participate in goal setting and plan of care.     Thank you for this referral.  Dariusz Block, PT   Time Calculation: 28 mins   Eval Complexity: History: MEDIUM  Complexity : 1-2 comorbidities / personal factors will impact the outcome/ POC Exam:LOW Complexity : 1-2 Standardized tests and measures addressing body structure, function, activity limitation and / or participation in recreation  Presentation: MEDIUM Complexity : Evolving with changing characteristics  Clinical Decision Making:Medium Complexity ambulate <30ft  Overall Complexity:MEDIUM

## 2019-11-15 NOTE — PROGRESS NOTES
Cardiology Progress Note        Patient: Marci Jennings        Sex: female          DOA: 11/13/2019  YOB: 1950      Age:  77 y.o.        LOS:  LOS: 2 days    Patient seen and examined, chart reviewed. Assessment/Plan     Patient Active Problem List   Diagnosis Code    Acute encephalopathy G93.40    Sinus bradycardia R00.1    EMILI (acute kidney injury) ([facility] [address] N17.9    Generalized weakness R53.1    Elevated troponin R79.89    Alcohol abuse F10.10    Hypothermia T68. Minnette Rank    Stroke (cerebrum) ([facility]) I63.9      Abnormal troponin, no evidence of acute coronary syndrome due to demand ischemia. Aortic aneurysm : Mildly dilated sinuses of Valsalva; diameter is 4 cm. Mildly dilated ascending aorta; diameter is 4 cm.  hypothyroidism    Plan:    Hold antihypertensive due to borderline blood pressure  Continue aspirin if okay with neurology  Continue management as per hospital medicine                  Subjective:    cc:  Denies any chest pain or shortness of breath      REVIEW OF SYSTEMS:     General: No fevers or chills. Cardiovascular: No chest pain,No palpitations, No orthopnea, No PND, No leg swelling, No claudication  Pulmonary: No dyspnea   Gastrointestinal: No nausea, vomiting, bleeding  Neurology: No Dizziness    Objective:      Visit Vitals  BP 93/66 (BP 1 Location: Right arm, BP Patient Position: At rest)   Pulse 65   Temp 97.3 °F (36.3 °C)   Resp 16   Ht 5' 8\" (1.727 m)   Wt 60.3 kg (133 lb)   SpO2 100%   BMI 20.22 kg/m²     Body mass index is 20.22 kg/m². Physical Exam:  General Appearance: Comfortable, not using accessory muscles of respiration. HEENT: MEGAN. HEAD: Atraumatic  NECK: No JVD, no thyroidomeglay. CAROTIDS:  LUNGS: Clear bilaterally. HEART: S1+S2 audible, no murmur, no pericardial rub. NEUROLOGICAL: Alert, follows verbal commands. .  Medication:  Current Facility-Administered Medications   Medication Dose Route Frequency    aspirin chewable tablet 81 mg  81 mg Oral DAILY    0.9% NaCl bacteriostatic (NORMAL SALINE) 0.9 % injection 15 mL  15 mL IntraVENous ONCE    hydrocortisone (CORTAID) 1 % cream   Topical BID    0.9% sodium chloride 1,000 mL with mvi, adult no. 4 with vit K 10 mL, thiamine 293 mg, folic acid 1 mg infusion   IntraVENous DAILY    pantoprazole (PROTONIX) tablet 40 mg  40 mg Oral ACB    0.9% sodium chloride infusion  75 mL/hr IntraVENous CONTINUOUS    acetaminophen (TYLENOL) tablet 650 mg  650 mg Oral Q4H PRN    HYDROcodone-acetaminophen (NORCO) 5-325 mg per tablet 1 Tab  1 Tab Oral Q4H PRN    naloxone (NARCAN) injection 0.4 mg  0.4 mg IntraVENous PRN    heparin (porcine) injection 5,000 Units  5,000 Units SubCUTAneous Q8H    pramipexole (MIRAPEX) tablet 1 mg  1 mg Oral TID    influenza vaccine 2019-20 (6 mos+)(PF) (FLUARIX/FLULAVAL/FLUZONE QUAD) injection 0.5 mL  0.5 mL IntraMUSCular PRIOR TO DISCHARGE    sodium chloride (NS) flush 5-40 mL  5-40 mL IntraVENous Q8H    sodium chloride (NS) flush 5-40 mL  5-40 mL IntraVENous PRN    LORazepam (ATIVAN) tablet 1 mg  1 mg Oral Q1H PRN    Or    LORazepam (ATIVAN) injection 1 mg  1 mg IntraVENous Q1H PRN    LORazepam (ATIVAN) tablet 2 mg  2 mg Oral Q1H PRN    Or    LORazepam (ATIVAN) injection 2 mg  2 mg IntraVENous Q1H PRN    LORazepam (ATIVAN) injection 3 mg  3 mg IntraVENous Q15MIN PRN    buPROPion XL (WELLBUTRIN XL) tablet 300 mg  300 mg Oral 7am    levothyroxine (SYNTHROID) tablet 175 mcg  175 mcg Oral ACB               Lab/Data Reviewed:       Recent Labs     11/15/19  0355 11/14/19  0350 11/13/19  1210   WBC 5.0 5.1 7.6   HGB 12.1 12.3 14.7   HCT 37.4 37.1 43.0    163 211     Recent Labs     11/15/19  0355 11/14/19  0350 11/13/19  1210    139 137   K 3.6 3.6 3.4*    105 94*   CO2 24 26 30   GLU 67* 79 121*   BUN 38* 50* 57*   CREA 1.10 1.20 1.62*   CA 9.1 9.1 11.2*       Signed By: Cristiano Lopes MD     November 15, 2019

## 2019-11-16 LAB
ANION GAP SERPL CALC-SCNC: 8 MMOL/L (ref 3–18)
BUN SERPL-MCNC: 27 MG/DL (ref 7–18)
BUN/CREAT SERPL: 27 (ref 12–20)
CALCIUM SERPL-MCNC: 9.2 MG/DL (ref 8.5–10.1)
CHLORIDE SERPL-SCNC: 107 MMOL/L (ref 100–111)
CHOLEST SERPL-MCNC: 169 MG/DL
CO2 SERPL-SCNC: 25 MMOL/L (ref 21–32)
CREAT SERPL-MCNC: 1 MG/DL (ref 0.6–1.3)
ERYTHROCYTE [DISTWIDTH] IN BLOOD BY AUTOMATED COUNT: 13.6 % (ref 11.6–14.5)
EST. AVERAGE GLUCOSE BLD GHB EST-MCNC: 131 MG/DL
GLUCOSE SERPL-MCNC: 80 MG/DL (ref 74–99)
HBA1C MFR BLD: 6.2 % (ref 4.2–5.6)
HCT VFR BLD AUTO: 36.8 % (ref 35–45)
HDLC SERPL-MCNC: 48 MG/DL (ref 40–60)
HDLC SERPL: 3.5 {RATIO} (ref 0–5)
HGB BLD-MCNC: 12 G/DL (ref 12–16)
LDLC SERPL CALC-MCNC: 93 MG/DL (ref 0–100)
LIPID PROFILE,FLP: NORMAL
MAGNESIUM SERPL-MCNC: 1.5 MG/DL (ref 1.6–2.6)
MCH RBC QN AUTO: 30.7 PG (ref 24–34)
MCHC RBC AUTO-ENTMCNC: 32.6 G/DL (ref 31–37)
MCV RBC AUTO: 94.1 FL (ref 74–97)
PLATELET # BLD AUTO: 169 K/UL (ref 135–420)
PMV BLD AUTO: 10.7 FL (ref 9.2–11.8)
POTASSIUM SERPL-SCNC: 3.7 MMOL/L (ref 3.5–5.5)
RBC # BLD AUTO: 3.91 M/UL (ref 4.2–5.3)
SODIUM SERPL-SCNC: 140 MMOL/L (ref 136–145)
TRIGL SERPL-MCNC: 140 MG/DL (ref ?–150)
VLDLC SERPL CALC-MCNC: 28 MG/DL
WBC # BLD AUTO: 4.8 K/UL (ref 4.6–13.2)

## 2019-11-16 PROCEDURE — 80061 LIPID PANEL: CPT

## 2019-11-16 PROCEDURE — 92610 EVALUATE SWALLOWING FUNCTION: CPT

## 2019-11-16 PROCEDURE — 74011250636 HC RX REV CODE- 250/636: Performed by: FAMILY MEDICINE

## 2019-11-16 PROCEDURE — 74011250636 HC RX REV CODE- 250/636: Performed by: HOSPITALIST

## 2019-11-16 PROCEDURE — 74011250637 HC RX REV CODE- 250/637: Performed by: INTERNAL MEDICINE

## 2019-11-16 PROCEDURE — 65660000000 HC RM CCU STEPDOWN

## 2019-11-16 PROCEDURE — 80048 BASIC METABOLIC PNL TOTAL CA: CPT

## 2019-11-16 PROCEDURE — 74011250637 HC RX REV CODE- 250/637: Performed by: HOSPITALIST

## 2019-11-16 PROCEDURE — 51798 US URINE CAPACITY MEASURE: CPT

## 2019-11-16 PROCEDURE — 85027 COMPLETE CBC AUTOMATED: CPT

## 2019-11-16 PROCEDURE — 83036 HEMOGLOBIN GLYCOSYLATED A1C: CPT

## 2019-11-16 PROCEDURE — 97116 GAIT TRAINING THERAPY: CPT

## 2019-11-16 PROCEDURE — 36415 COLL VENOUS BLD VENIPUNCTURE: CPT

## 2019-11-16 PROCEDURE — 74011000250 HC RX REV CODE- 250: Performed by: HOSPITALIST

## 2019-11-16 PROCEDURE — 74011250637 HC RX REV CODE- 250/637: Performed by: FAMILY MEDICINE

## 2019-11-16 PROCEDURE — 83735 ASSAY OF MAGNESIUM: CPT

## 2019-11-16 PROCEDURE — 77030040830 HC CATH URETH FOL MDII -A

## 2019-11-16 RX ORDER — PRAMIPEXOLE DIHYDROCHLORIDE 0.25 MG/1
1 TABLET ORAL
Status: DISCONTINUED | OUTPATIENT
Start: 2019-11-16 | End: 2019-11-21 | Stop reason: HOSPADM

## 2019-11-16 RX ADMIN — FOLIC ACID: 5 INJECTION, SOLUTION INTRAMUSCULAR; INTRAVENOUS; SUBCUTANEOUS at 08:47

## 2019-11-16 RX ADMIN — HEPARIN SODIUM 5000 UNITS: 5000 INJECTION INTRAVENOUS; SUBCUTANEOUS at 15:23

## 2019-11-16 RX ADMIN — HEPARIN SODIUM 5000 UNITS: 5000 INJECTION INTRAVENOUS; SUBCUTANEOUS at 01:43

## 2019-11-16 RX ADMIN — ASPIRIN 81 MG 81 MG: 81 TABLET ORAL at 08:33

## 2019-11-16 RX ADMIN — Medication 10 ML: at 06:48

## 2019-11-16 RX ADMIN — BUPROPION HYDROCHLORIDE 300 MG: 150 TABLET, FILM COATED, EXTENDED RELEASE ORAL at 06:47

## 2019-11-16 RX ADMIN — HEPARIN SODIUM 5000 UNITS: 5000 INJECTION INTRAVENOUS; SUBCUTANEOUS at 06:56

## 2019-11-16 RX ADMIN — LEVOTHYROXINE SODIUM 175 MCG: 100 TABLET ORAL at 06:47

## 2019-11-16 RX ADMIN — PANTOPRAZOLE SODIUM 40 MG: 40 TABLET, DELAYED RELEASE ORAL at 06:47

## 2019-11-16 RX ADMIN — Medication 10 ML: at 23:32

## 2019-11-16 RX ADMIN — HYDROCORTISONE: 1 CREAM TOPICAL at 08:47

## 2019-11-16 RX ADMIN — Medication 10 ML: at 15:24

## 2019-11-16 NOTE — PROGRESS NOTES
Problem: Pressure Injury - Risk of  Goal: *Prevention of pressure injury  Description  Document Tremayne Scale and appropriate interventions in the flowsheet. Outcome: Progressing Towards Goal  Note:   Pressure Injury Interventions:  Sensory Interventions: Assess changes in LOC, Assess need for specialty bed, Discuss PT/OT consult with provider, Keep linens dry and wrinkle-free, Maintain/enhance activity level, Minimize linen layers, Monitor skin under medical devices    Moisture Interventions: Absorbent underpads, Assess need for specialty bed, Check for incontinence Q2 hours and as needed, Internal/External urinary devices, Minimize layers, Offer toileting Q_hr    Activity Interventions: PT/OT evaluation, Pressure redistribution bed/mattress(bed type), Increase time out of bed    Mobility Interventions: HOB 30 degrees or less, PT/OT evaluation, Pressure redistribution bed/mattress (bed type)    Nutrition Interventions: Document food/fluid/supplement intake    Friction and Shear Interventions: HOB 30 degrees or less, Minimize layers                Problem: Patient Education: Go to Patient Education Activity  Goal: Patient/Family Education  Outcome: Progressing Towards Goal     Problem: Falls - Risk of  Goal: *Absence of Falls  Description  Document Gudelia Fall Risk and appropriate interventions in the flowsheet.   Outcome: Progressing Towards Goal  Note:   Fall Risk Interventions:  Mobility Interventions: Assess mobility with egress test, Bed/chair exit alarm, Communicate number of staff needed for ambulation/transfer, Patient to call before getting OOB, PT Consult for mobility concerns, PT Consult for assist device competence, Strengthening exercises (ROM-active/passive)    Mentation Interventions: Bed/chair exit alarm, Adequate sleep, hydration, pain control, Door open when patient unattended, Evaluate medications/consider consulting pharmacy, More frequent rounding, Reorient patient, Room close to nurse's station, Toileting rounds    Medication Interventions: Bed/chair exit alarm, Patient to call before getting OOB, Teach patient to arise slowly    Elimination Interventions: Bed/chair exit alarm, Call light in reach, Patient to call for help with toileting needs, Stay With Me (per policy), Toileting schedule/hourly rounds    History of Falls Interventions: Bed/chair exit alarm, Door open when patient unattended, Investigate reason for fall, Room close to nurse's station, Consult care management for discharge planning, Evaluate medications/consider consulting pharmacy         Problem: Patient Education: Go to Patient Education Activity  Goal: Patient/Family Education  Outcome: Progressing Towards Goal     Problem: Patient Education: Go to Patient Education Activity  Goal: Patient/Family Education  Outcome: Progressing Towards Goal     Problem: Patient Education: Go to Patient Education Activity  Goal: Patient/Family Education  Outcome: Progressing Towards Goal

## 2019-11-16 NOTE — PROGRESS NOTES
Problem: Dysphagia (Adult)  Goal: *Acute Goals and Plan of Care (Insert Text)  Description  Patient will:  1. Tolerate PO trials with 0 s/s overt distress in 4/5 trials  2. Utilize compensatory swallow strategies/maneuvers (decrease bite/sip, size/rate, alt. liq/sol) with min cues in 4/5 trials    Rec:     Reg solid with thin liquids  Aspiration precautions  HOB >45 during po intake, remain >30 for 30-45 minutes after po   Small bites/sips; alternate liquid/solid with slow feeding rate   Oral care TID  Meds per pt preference     Outcome: Progressing Towards Goal    SPEECH LANGUAGE PATHOLOGY BEDSIDE SWALLOW EVALUATION    Patient: Harsh Davis (89 y.o. female)  Date: 11/16/2019  Primary Diagnosis: Altered mental status [R41.82]        Precautions: aspiration  Fall  PLOF: As per H&P    ASSESSMENT :  Based on the objective data described below, the patient presents with oropharyngeal swallow fxn essentially WFL. Strength, ROM, and coordination of the orofacial musculature were all found to be [facility]. All structures were intact and symmetrical. The pt presented with adequate oral transit times on all consistencies. Mastication time was appropriate. No s/sx of aspiration noted; hyolaryngeal elevation and excursion appeared adequate on all consistencies. No oral stasis noted post swallow. The pt denied globus sensation post swallow. Speech production was fluent. No dysarthria was observed. Expressive/receptive speech production appeared WFL to informal observation. Pt communicated in sentences of appropriate form, content, and use. Rec reg solid diet with thin liquids, aspiration precautions, oral care TID, and meds as tolerated. ST will continue to follow x 1-2 visits to ensure safety of diet tolerance. Patient will benefit from skilled intervention to address the above impairments. Patient's rehabilitation potential is considered to be Good  Factors which may influence rehabilitation potential include:   ? Mental ability/status  ? Medical condition     PLAN :  Recommendations and Planned Interventions: See above  Frequency/Duration: Patient will be followed by speech-language pathology x 1-2 more visits to address goals. Discharge Recommendations: [address] and To Be Determined     SUBJECTIVE:   Patient stated I usually don't drink my tea with any sugar in it. OBJECTIVE:     Past Medical History:   Diagnosis Date    CAD (coronary artery disease)     thyroid    Hypertension    No past surgical history on file. Prior Level of Function/Home Situation: see below  Home Situation  Home Environment: (Condo)  # Steps to Enter: 2  One/Two Story Residence: Two story  # of Interior Steps: (pt sister unsure; approx 10 steps)  Living Alone: Yes  Support Systems: Family member(s)  Patient Expects to be Discharged to[de-identified] Unknown  Current DME Used/Available at Home: Grab bars  Tub or Shower Type: Tub/Shower combination(has grab bars; no shower chair)    Diet prior to admission: suspect reg solid with thin  Current Diet:  reg solid with thin     Cognitive and Communication Status:  Neurologic State: Alert  Orientation Level: Oriented to person, Disoriented to time, Disoriented to situation, Disoriented to place  Cognition: Follows commands  Perception: Appears intact  Perseveration: No perseveration noted  Safety/Judgement: Decreased insight into deficits  Oral Assessment:  Oral Assessment  Labial: No impairment  Dentition: Natural;Intact  Oral Hygiene: adequate  Lingual: No impairment  Velum: No impairment  Mandible: No impairment  P.O. Trials:  Patient Position: [facility]  Vocal quality prior to P.O.: No impairment  Consistency Presented: Thin liquid; Solid;Puree  How Presented: Self-fed/presented;Cup/sip;Spoon;Straw  Bolus Acceptance: No impairment  Bolus Formation/Control: No impairment  Propulsion: No impairment  Oral Residue: None  Initiation of Swallow: No impairment  Laryngeal Elevation: Functional  Aspiration Signs/Symptoms: None  Pharyngeal Phase Characteristics: No impairment, issues, or problems   Effective Modifications: None  Cues for Modifications: None     Oral Phase Severity: No impairment  Pharyngeal Phase Severity : No impairment    PAIN:  Start of Eval: 0  End of Eval: 0     After treatment:   ?            Patient left in no apparent distress sitting up in chair  ? Patient left in no apparent distress in bed  ? Call bell left within reach  ? Nursing notified  ? Family present  ? Caregiver present  ? Bed alarm activated    COMMUNICATION/EDUCATION:   ?            Aspiration precautions; swallow safety; compensatory techniques. ?            Patient/family have participated as able in goal setting and plan of care.     Thank you for this referral.    Rafael Olivas M.S. CCC-SLP/L  Speech-Language Pathologist

## 2019-11-16 NOTE — PROGRESS NOTES
Problem: Mobility Impaired (Adult and Pediatric)  Goal: *Acute Goals and Plan of Care (Insert Text)  Description  Physical Therapy Goals   Initiated 11/15/2019 and to be accomplished within 3-5 day(s)  1. Patient will move from supine <> sit with Mod I in prep for out of bed activity and change of position. 2.  Patient will perform sit<> stand with S with LRAD in prep for transfers/ambulation. 3.  Patient will transfer from bed <> chair with S with LRAD for time up in chair for completion of ADL activity. 4.  Patient will ambulate >100 feet with LRAD/S for improved functional mobility/safe discharge. Outcome: Progressing Towards Goal   PHYSICAL THERAPY TREATMENT    Patient: Marci Jennings (27 y.o. female)  Date: 11/16/2019  Diagnosis: Altered mental status [R41.82] <principal problem not specified>       Precautions: Fall   Chart, physical therapy assessment, plan of care and goals were reviewed. ASSESSMENT:  Pt supine in bed, LIA LE off side of bed with pt trunk in middle of bed, slouched. Pt agreeable to PT but still demo's confusion and decreased safety awareness. Pt completed mobility today with less assistance req, but demo'd bouts of confusion during amb, further decreasing safety awareness. Pt returned to bed, all needs met, bed alarm on. Progression toward goals:  ?      Improving appropriately and progressing toward goals  ? Improving slowly and progressing toward goals  ? Not making progress toward goals and plan of care will be adjusted     PLAN:  Patient continues to benefit from skilled intervention to address the above impairments. Continue treatment per established plan of care. Discharge Recommendations:  Rehab  Further Equipment Recommendations for Discharge:  TBD      SUBJECTIVE:   Patient stated Neno Zhong.     OBJECTIVE DATA SUMMARY:   Critical Behavior:  Neurologic State: Confused, Alert  Orientation Level: Oriented to person, Disoriented to place, Disoriented to situation, Disoriented to time  Cognition: Follows commands, Impaired decision making  Safety/Judgement: Decreased insight into deficits  Functional Mobility Training:  Bed Mobility:   Supine to Sit: Contact guard assistance  Sit to Supine: Contact guard assistance  Scooting: Contact guard assistance  Transfers:  Sit to Stand: Minimum assistance  Stand to Sit: Minimum assistance  Balance:  Sitting: Impaired  Sitting - Static: Good (unsupported)  Sitting - Dynamic: Fair (occasional)  Standing: Impaired  Standing - Static: Fair  Standing - Dynamic : Poor  Ambulation/Gait Training:  Distance (ft): 10 Feet (ft)  Assistive Device: Gait belt;Walker, rolling  Ambulation - Level of Assistance: Minimal assistance  Gait Abnormalities: Trunk sway increased;Decreased step clearance  Base of Support: Narrowed  Stance: Time  Speed/Kayla: Slow  Step Length: Right shortened;Left shortened  Pain:  Pain Scale 1: Numeric (0 - 10)  Pain Intensity 1: 0  Activity Tolerance:   Fair+  Please refer to the flowsheet for vital signs taken during this treatment. After treatment:   ? Patient left in no apparent distress sitting up in chair  ? Patient left in no apparent distress in bed  ? Call bell left within reach  ? Nursing notified  ? Caregiver present  ?  Bed alarm activated      Leah Dennis   Time Calculation: 14 mins

## 2019-11-16 NOTE — PROGRESS NOTES
Hospitalist Progress Note    Patient: Sánchez Dempsey MRN: [id number]  CSN: [id number]    YOB: 1950  Age: 77 y.o. Sex: female    DOA: 11/13/2019 LOS:  LOS: 3 days            Assessment/Plan      1. Acute encephalopathy resolving but not clear, secondary to etoh +/- strokes  2. Multiple tiny acute small vessel infarcts right parieto- occiptal white matter and bilateral thalami subacute v chronic v ischemic change. No focal neurologic deficits noted  3. Hyopthermia/ bradycardia- resolved  4. EMILI- resolved  5. Hypothyroidism   6. Urinary retentino  7. Allergic reaction    Plan:  - continue aspirin, lipitor stopped 2/2 hives  - continue synthroid, wellbutrin  - decrease mirapex to qhs dosing  - mobilize w PT/OT  - mvi, thiamine, folic acid      Patient Active Problem List   Diagnosis Code    Acute encephalopathy G93.40    Sinus bradycardia R00.1    EMILI (acute kidney injury) ([facility] [address] N17.9    Generalized weakness R53.1    Elevated troponin R79.89    Alcohol abuse F10.10    Hypothermia T68. XXXA    Stroke (cerebrum) ([facility]) I63.9               Subjective:    cc: \" Im in [place] news\"  Pt w urticaria yesterday  Remains confused      REVIEW OF SYSTEMS:  General: No fevers or chills. Cardiovascular: No chest pain or pressure. No palpitations. Pulmonary: No shortness of breath. Gastrointestinal: No nausea, vomiting. Objective:        Vital signs/Intake and Output:  Visit Vitals  /85   Pulse 67   Temp 97.9 °F (36.6 °C)   Resp 14   Ht 5' 8\" (1.727 m)   Wt 60.3 kg (133 lb)   SpO2 100%   BMI 20.22 kg/m²     Current Shift:  No intake/output data recorded. Last three shifts:  11/14 1901 - 11/16 0700  In: 1390 [P.O.:490; I.V.:900]  Out: 900 [Urine:900]    Body mass index is 20.22 kg/m².     Physical Exam:  GEN: Alert and oriented timestwo NAD  EYES: conjunctiva normal, lids with out lesions  HEENT: MMM, No thyromegaly, no lymphadenopathy  HEART: RRR +S1 +S2, no JVD, pulses 2+ distally  LUNGS: CTA B/L no wheezes, rales or rhonchi  ABDOMEN: + BS, soft NT/ND no organomegaly,  No rebound  EXTREMITIES: No edema cyanosis, cap refill normal   SKIN: no rashes or skin breakdown, no nodules, normal turgor  Current Facility-Administered Medications   Medication Dose Route Frequency    aspirin chewable tablet 81 mg  81 mg Oral DAILY    hydrocortisone (CORTAID) 1 % cream   Topical BID    0.9% sodium chloride 1,000 mL with mvi, adult no. 4 with vit K 10 mL, thiamine 933 mg, folic acid 1 mg infusion   IntraVENous DAILY    pantoprazole (PROTONIX) tablet 40 mg  40 mg Oral ACB    0.9% sodium chloride infusion  75 mL/hr IntraVENous CONTINUOUS    acetaminophen (TYLENOL) tablet 650 mg  650 mg Oral Q4H PRN    HYDROcodone-acetaminophen (NORCO) 5-325 mg per tablet 1 Tab  1 Tab Oral Q4H PRN    naloxone (NARCAN) injection 0.4 mg  0.4 mg IntraVENous PRN    heparin (porcine) injection 5,000 Units  5,000 Units SubCUTAneous Q8H    pramipexole (MIRAPEX) tablet 1 mg  1 mg Oral TID    influenza vaccine 2019-20 (6 mos+)(PF) (FLUARIX/FLULAVAL/FLUZONE QUAD) injection 0.5 mL  0.5 mL IntraMUSCular PRIOR TO DISCHARGE    sodium chloride (NS) flush 5-40 mL  5-40 mL IntraVENous Q8H    sodium chloride (NS) flush 5-40 mL  5-40 mL IntraVENous PRN    LORazepam (ATIVAN) tablet 1 mg  1 mg Oral Q1H PRN    Or    LORazepam (ATIVAN) injection 1 mg  1 mg IntraVENous Q1H PRN    LORazepam (ATIVAN) tablet 2 mg  2 mg Oral Q1H PRN    Or    LORazepam (ATIVAN) injection 2 mg  2 mg IntraVENous Q1H PRN    LORazepam (ATIVAN) injection 3 mg  3 mg IntraVENous Q15MIN PRN    buPROPion XL (WELLBUTRIN XL) tablet 300 mg  300 mg Oral 7am    levothyroxine (SYNTHROID) tablet 175 mcg  175 mcg Oral ACB         All the patient's labs over the past 24 hours were reviewed both during my initial daily workflow process and at the time notated as \"note time\" in [facility].   (It is not time stamped separately in this workflow.)  Select labs are listed below.         Labs: Results:       Chemistry Recent Labs     11/16/19  0130 11/15/19  0355 11/14/19  0350 11/13/19  1210   GLU 80 67* 79 121*    143 139 137   K 3.7 3.6 3.6 3.4*    109 105 94*   CO2 25 24 26 30   BUN 27* 38* 50* 57*   CREA 1.00 1.10 1.20 1.62*   CA 9.2 9.1 9.1 11.2*   AGAP 8 10 8 13   BUCR 27* 35* 42* 35*   AP  --   --   --  57   TP  --   --   --  7.5   ALB  --   --   --  4.4   GLOB  --   --   --  3.1   AGRAT  --   --   --  1.4      CBC w/Diff Recent Labs     11/16/19  0130 11/15/19  0355 11/14/19  0350 11/13/19  1210   WBC 4.8 5.0 5.1 7.6   RBC 3.91* 3.95* 4.05* 4.76   HGB 12.0 12.1 12.3 14.7   HCT 36.8 37.4 37.1 43.0    153 163 211   GRANS  --   --  79* 84*   LYMPH  --   --  11* 6*   EOS  --   --  3 2      Cardiac Enzymes Recent Labs     11/13/19  2133 11/13/19  1748    243*   CKND1 4.4* 3.6                  Liver Enzymes Recent Labs     11/13/19  1210   TP 7.5   ALB 4.4   AP 57   SGOT 30      Thyroid Studies Lab Results   Component Value Date/Time    TSH 15.50 (H) 11/13/2019 12:10 PM        Procedures/imaging: see electronic medical records for all procedures/Xrays and details which were not copied into this note but were reviewed prior to creation of Plan    Imaging personally reviewed:    cta head/neck      Bernardo Gonzales DO  Internal Medicine/Geriatrics

## 2019-11-16 NOTE — PROGRESS NOTES
0039 Pt has not voided and is on 75ml of NS. Bladder scan resulted in 615ml. Nurse spoke with Dr. Helen Leon and received order for moore. Assisting nurse suggest to attempt to get pt moving. Pt was placed on bedside commode and has urinated 900 ml. Pt returned to bed. Will continue to monitor. 0730 Bedside and Verbal shift change report given to Faviola Arguello RN (oncoming nurse) by Vanessa Garcia. Neyda Manriquez RN (offgoing nurse). Report included the following information SBAR, Kardex and Intake/Output.

## 2019-11-16 NOTE — PROGRESS NOTES
6180:  Assumed care for patient, received bedside report from Christine Garsia RN. Patient quietly lying in bed with no complaints of pain or discomfort at the time. NIH scale is 2. Whiteboard updated, bed at the lowest position with call bell within reach.

## 2019-11-16 NOTE — PROGRESS NOTES
Cardiology Progress Note        Patient: Griselda Hicks        Sex: female          DOA: 11/13/2019  YOB: 1950      Age:  77 y.o.        LOS:  LOS: 3 days    Patient seen and examined, chart reviewed. Assessment/Plan     Patient Active Problem List   Diagnosis Code    Acute encephalopathy G93.40    Sinus bradycardia R00.1    EMILI (acute kidney injury) ([facility] [address] N17.9    Generalized weakness R53.1    Elevated troponin R79.89    Alcohol abuse F10.10    Hypothermia T68. Fallon Gloria    Stroke (cerebrum) ([facility]) I63.9      Abnormal troponin, no evidence of acute coronary syndrome due to demand ischemia. Aortic aneurysm : Mildly dilated sinuses of Valsalva; diameter is 4 cm. Mildly dilated ascending aorta; diameter is 4 cm.  hypothyroidism    Plan:    Hold antihypertensive due to borderline blood pressure  Continue aspirin   She will need CTA of thoracic and abdominal aorta to evaluate aortic aneurysm. Continue management as per hospital medicine                  Subjective:    cc:  Denies any chest pain or shortness of breath      REVIEW OF SYSTEMS:     General: No fevers or chills. Cardiovascular: No chest pain,No palpitations, No orthopnea, No PND, No leg swelling, No claudication  Pulmonary: No dyspnea   Gastrointestinal: No nausea, vomiting, bleeding  Neurology: No Dizziness    Objective:      Visit Vitals  /80   Pulse 63   Temp 97.4 °F (36.3 °C)   Resp 14   Ht 5' 8\" (1.727 m)   Wt 60.3 kg (133 lb)   SpO2 100%   BMI 20.22 kg/m²     Body mass index is 20.22 kg/m². Physical Exam:  General Appearance: Comfortable, not using accessory muscles of respiration. HEENT: MEGAN. HEAD: Atraumatic  NECK: No JVD, no thyroidomeglay. CAROTIDS: No bruit   LUNGS: Clear bilaterally. HEART: S1+S2 audible, no murmur, no pericardial rub. NEUROLOGICAL: Alert, follows verbal commands. .  Medication:  Current Facility-Administered Medications   Medication Dose Route Frequency    pramipexole (MIRAPEX) tablet 1 mg  1 mg Oral QHS    aspirin chewable tablet 81 mg  81 mg Oral DAILY    hydrocortisone (CORTAID) 1 % cream   Topical BID    0.9% sodium chloride 1,000 mL with mvi, adult no. 4 with vit K 10 mL, thiamine 486 mg, folic acid 1 mg infusion   IntraVENous DAILY    pantoprazole (PROTONIX) tablet 40 mg  40 mg Oral ACB    0.9% sodium chloride infusion  75 mL/hr IntraVENous CONTINUOUS    acetaminophen (TYLENOL) tablet 650 mg  650 mg Oral Q4H PRN    HYDROcodone-acetaminophen (NORCO) 5-325 mg per tablet 1 Tab  1 Tab Oral Q4H PRN    naloxone (NARCAN) injection 0.4 mg  0.4 mg IntraVENous PRN    heparin (porcine) injection 5,000 Units  5,000 Units SubCUTAneous Q8H    influenza vaccine 2019-20 (6 mos+)(PF) (FLUARIX/FLULAVAL/FLUZONE QUAD) injection 0.5 mL  0.5 mL IntraMUSCular PRIOR TO DISCHARGE    sodium chloride (NS) flush 5-40 mL  5-40 mL IntraVENous Q8H    sodium chloride (NS) flush 5-40 mL  5-40 mL IntraVENous PRN    LORazepam (ATIVAN) tablet 1 mg  1 mg Oral Q1H PRN    Or    LORazepam (ATIVAN) injection 1 mg  1 mg IntraVENous Q1H PRN    LORazepam (ATIVAN) tablet 2 mg  2 mg Oral Q1H PRN    Or    LORazepam (ATIVAN) injection 2 mg  2 mg IntraVENous Q1H PRN    LORazepam (ATIVAN) injection 3 mg  3 mg IntraVENous Q15MIN PRN    buPROPion XL (WELLBUTRIN XL) tablet 300 mg  300 mg Oral 7am    levothyroxine (SYNTHROID) tablet 175 mcg  175 mcg Oral ACB               Lab/Data Reviewed:       Recent Labs     11/16/19  0130 11/15/19  0355 11/14/19  0350   WBC 4.8 5.0 5.1   HGB 12.0 12.1 12.3   HCT 36.8 37.4 37.1    153 163     Recent Labs     11/16/19  0130 11/15/19  0355 11/14/19  0350    143 139   K 3.7 3.6 3.6    109 105   CO2 25 24 26   GLU 80 67* 79   BUN 27* 38* 50*   CREA 1.00 1.10 1.20   CA 9.2 9.1 9.1       Signed By: Cherelle Mcdaniel MD     November 16, 2019

## 2019-11-16 NOTE — PROGRESS NOTES
Stroke workup including CTA head and neck, HbA1C and FLP are reviewed and unremarkable except 2-3 mm diameter saccular aneurysm distal right supraclinoid ICA, intradural, with 2-3 mm diameter extradural saccular aneurysm horizontal right cavernous ICA, 1 mm diameter saccular aneurysm horizontal left cavernous ICA. Continue monitor, BP< 140/90 mm Hg. She should follow up with neurologist and neurointerventionist [facility] 2 months after discharge.

## 2019-11-17 LAB — MAGNESIUM SERPL-MCNC: 1.1 MG/DL (ref 1.6–2.6)

## 2019-11-17 PROCEDURE — 65660000000 HC RM CCU STEPDOWN

## 2019-11-17 PROCEDURE — 97530 THERAPEUTIC ACTIVITIES: CPT

## 2019-11-17 PROCEDURE — 74011250637 HC RX REV CODE- 250/637: Performed by: HOSPITALIST

## 2019-11-17 PROCEDURE — 74011250636 HC RX REV CODE- 250/636: Performed by: FAMILY MEDICINE

## 2019-11-17 PROCEDURE — 74011250636 HC RX REV CODE- 250/636: Performed by: HOSPITALIST

## 2019-11-17 PROCEDURE — 83735 ASSAY OF MAGNESIUM: CPT

## 2019-11-17 PROCEDURE — 74011250636 HC RX REV CODE- 250/636: Performed by: INTERNAL MEDICINE

## 2019-11-17 PROCEDURE — 36415 COLL VENOUS BLD VENIPUNCTURE: CPT

## 2019-11-17 PROCEDURE — 74011250637 HC RX REV CODE- 250/637: Performed by: INTERNAL MEDICINE

## 2019-11-17 PROCEDURE — 74011250637 HC RX REV CODE- 250/637: Performed by: FAMILY MEDICINE

## 2019-11-17 RX ORDER — MAGNESIUM SULFATE HEPTAHYDRATE 40 MG/ML
2 INJECTION, SOLUTION INTRAVENOUS ONCE
Status: COMPLETED | OUTPATIENT
Start: 2019-11-17 | End: 2019-11-17

## 2019-11-17 RX ADMIN — Medication 10 ML: at 18:24

## 2019-11-17 RX ADMIN — BUPROPION HYDROCHLORIDE 300 MG: 150 TABLET, FILM COATED, EXTENDED RELEASE ORAL at 08:17

## 2019-11-17 RX ADMIN — HEPARIN SODIUM 5000 UNITS: 5000 INJECTION INTRAVENOUS; SUBCUTANEOUS at 18:23

## 2019-11-17 RX ADMIN — LORAZEPAM 1 MG: 2 INJECTION INTRAMUSCULAR; INTRAVENOUS at 21:45

## 2019-11-17 RX ADMIN — HEPARIN SODIUM 5000 UNITS: 5000 INJECTION INTRAVENOUS; SUBCUTANEOUS at 08:17

## 2019-11-17 RX ADMIN — HEPARIN SODIUM 5000 UNITS: 5000 INJECTION INTRAVENOUS; SUBCUTANEOUS at 23:03

## 2019-11-17 RX ADMIN — SODIUM CHLORIDE 75 ML/HR: 900 INJECTION, SOLUTION INTRAVENOUS at 21:53

## 2019-11-17 RX ADMIN — LEVOTHYROXINE SODIUM 175 MCG: 100 TABLET ORAL at 08:16

## 2019-11-17 RX ADMIN — PRAMIPEXOLE DIHYDROCHLORIDE 1 MG: 0.25 TABLET ORAL at 21:45

## 2019-11-17 RX ADMIN — HYDROCORTISONE: 1 CREAM TOPICAL at 21:46

## 2019-11-17 RX ADMIN — ASPIRIN 81 MG 81 MG: 81 TABLET ORAL at 08:16

## 2019-11-17 RX ADMIN — MAGNESIUM SULFATE HEPTAHYDRATE 2 G: 40 INJECTION, SOLUTION INTRAVENOUS at 10:13

## 2019-11-17 RX ADMIN — Medication 10 ML: at 21:46

## 2019-11-17 RX ADMIN — Medication 10 ML: at 06:32

## 2019-11-17 RX ADMIN — HYDROCORTISONE: 1 CREAM TOPICAL at 08:39

## 2019-11-17 RX ADMIN — PANTOPRAZOLE SODIUM 40 MG: 40 TABLET, DELAYED RELEASE ORAL at 08:17

## 2019-11-17 NOTE — PROGRESS NOTES
Hospitalist Progress Note    Patient: Nehal Mcrae MRN: [id number]  CSN: [id number]    YOB: 1950  Age: 77 y.o. Sex: female    DOA: 11/13/2019 LOS:  LOS: 4 days            Assessment/Plan     1. Acute encephalopathy resolving but still not clear, secondary to etoh +/- strokes  2. Multiple tiny acute small vessel infarcts right parieto- occiptal white matter and bilateral thalami subacute v chronic v ischemic change. No focal neurologic deficits noted  3. Hyopthermia/ bradycardia- resolved  4.  EMILI- resolved  5. Hypothyroidism   6. Urinary retentino  7. Allergic reaction  8. hypomagnasemia    Plan:  - contineu aspirin  - needs outpt neurointervention follow up  - mvi, thiamine, folic acid  - continues to require inpt hospitalization      Patient Active Problem List   Diagnosis Code    Acute encephalopathy G93.40    Sinus bradycardia R00.1    EMILI (acute kidney injury) ([facility] [address] N17.9    Generalized weakness R53.1    Elevated troponin R79.89    Alcohol abuse F10.10    Hypothermia T68. Ever Deiters    Stroke (cerebrum) ([facility]) I63.9               Subjective:    cc: AMS  Pt remains disoriented to now aware she is in hospital  No acute overnight events noted  Tolerating medicaiton regimen      REVIEW OF SYSTEMS:  General: No fevers or chills. Cardiovascular: No chest pain or pressure. No palpitations. Pulmonary: No shortness of breath. Gastrointestinal: No nausea, vomiting. Objective:        Vital signs/Intake and Output:  Visit Vitals  /84   Pulse 66   Temp 97.9 °F (36.6 °C)   Resp 13   Ht 5' 8\" (1.727 m)   Wt 65.6 kg (144 lb 11.2 oz)   SpO2 100%   BMI 22.00 kg/m²     Current Shift:  No intake/output data recorded. Last three shifts:  11/15 1901 - 11/17 0700  In: 2698.8 [P.O.:220; I.V.:2478.8]  Out: 900 [Urine:900]    Body mass index is 22 kg/m².     Physical Exam:  GEN: Alert and oriented times two, NAD  EYES: conjunctiva normal, lids with out lesions  HEENT: MMM, No thyromegaly, no lymphadenopathy  HEART: RRR +S1 +S2, no JVD, pulses 2+ distally  LUNGS: CTA B/L no wheezes, rales or rhonchi  ABDOMEN: + BS, soft NT/ND no organomegaly,  No rebound  EXTREMITIES: No edema cyanosis, cap refill normal   SKIN: no rashes or skin breakdown, no nodules, normal turgor  Current Facility-Administered Medications   Medication Dose Route Frequency    magnesium sulfate 2 g/50 ml IVPB (premix or compounded)  2 g IntraVENous ONCE    pramipexole (MIRAPEX) tablet 1 mg  1 mg Oral QHS    aspirin chewable tablet 81 mg  81 mg Oral DAILY    hydrocortisone (CORTAID) 1 % cream   Topical BID    pantoprazole (PROTONIX) tablet 40 mg  40 mg Oral ACB    0.9% sodium chloride infusion  75 mL/hr IntraVENous CONTINUOUS    acetaminophen (TYLENOL) tablet 650 mg  650 mg Oral Q4H PRN    HYDROcodone-acetaminophen (NORCO) 5-325 mg per tablet 1 Tab  1 Tab Oral Q4H PRN    naloxone (NARCAN) injection 0.4 mg  0.4 mg IntraVENous PRN    heparin (porcine) injection 5,000 Units  5,000 Units SubCUTAneous Q8H    influenza vaccine 2019-20 (6 mos+)(PF) (FLUARIX/FLULAVAL/FLUZONE QUAD) injection 0.5 mL  0.5 mL IntraMUSCular PRIOR TO DISCHARGE    sodium chloride (NS) flush 5-40 mL  5-40 mL IntraVENous Q8H    sodium chloride (NS) flush 5-40 mL  5-40 mL IntraVENous PRN    LORazepam (ATIVAN) tablet 1 mg  1 mg Oral Q1H PRN    Or    LORazepam (ATIVAN) injection 1 mg  1 mg IntraVENous Q1H PRN    LORazepam (ATIVAN) tablet 2 mg  2 mg Oral Q1H PRN    Or    LORazepam (ATIVAN) injection 2 mg  2 mg IntraVENous Q1H PRN    LORazepam (ATIVAN) injection 3 mg  3 mg IntraVENous Q15MIN PRN    buPROPion XL (WELLBUTRIN XL) tablet 300 mg  300 mg Oral 7am    levothyroxine (SYNTHROID) tablet 175 mcg  175 mcg Oral ACB         All the patient's labs over the past 24 hours were reviewed both during my initial daily workflow process and at the time notated as \"note time\" in [facility].   (It is not time stamped separately in this workflow.)  Select labs are listed below.         Labs: Results:       Chemistry Recent Labs     11/16/19  0130 11/15/19  0355   GLU 80 67*    143   K 3.7 3.6    109   CO2 25 24   BUN 27* 38*   CREA 1.00 1.10   CA 9.2 9.1   AGAP 8 10   BUCR 27* 35*      CBC w/Diff Recent Labs     11/16/19  0130 11/15/19  0355   WBC 4.8 5.0   RBC 3.91* 3.95*   HGB 12.0 12.1   HCT 36.8 37.4    153              Lipid Panel Lab Results   Component Value Date/Time    Cholesterol, total 169 11/16/2019 01:30 AM    HDL Cholesterol 48 11/16/2019 01:30 AM    LDL, calculated 93 11/16/2019 01:30 AM    VLDL, calculated 28 11/16/2019 01:30 AM    Triglyceride 140 11/16/2019 01:30 AM    CHOL/HDL Ratio 3.5 11/16/2019 01:30 AM              Thyroid Studies Lab Results   Component Value Date/Time    TSH 15.50 (H) 11/13/2019 12:10 PM        Procedures/imaging: see electronic medical records for all procedures/Xrays and details which were not copied into this note but were reviewed prior to creation of Ashley 98, DO  Internal Medicine/Geriatrics

## 2019-11-17 NOTE — PROGRESS NOTES
Problem: Pressure Injury - Risk of  Goal: *Prevention of pressure injury  Description  Document Tremayne Scale and appropriate interventions in the flowsheet. Outcome: Progressing Towards Goal  Note:   Pressure Injury Interventions:  Sensory Interventions: Assess changes in LOC, Avoid rigorous massage over bony prominences, Check visual cues for pain, Pressure redistribution bed/mattress (bed type), Turn and reposition approx. every two hours (pillows and wedges if needed)    Moisture Interventions: Absorbent underpads    Activity Interventions: Increase time out of bed, Pressure redistribution bed/mattress(bed type)    Mobility Interventions: HOB 30 degrees or less, Pressure redistribution bed/mattress (bed type)    Nutrition Interventions: Document food/fluid/supplement intake, Offer support with meals,snacks and hydration    Friction and Shear Interventions: HOB 30 degrees or less, Lift sheet                Problem: Patient Education: Go to Patient Education Activity  Goal: Patient/Family Education  Outcome: Progressing Towards Goal     Problem: Falls - Risk of  Goal: *Absence of Falls  Description  Document Gudelia Fall Risk and appropriate interventions in the flowsheet.   Outcome: Progressing Towards Goal  Note:   Fall Risk Interventions:  Mobility Interventions: Bed/chair exit alarm, Communicate number of staff needed for ambulation/transfer, Patient to call before getting OOB, Utilize walker, cane, or other assistive device, PT Consult for mobility concerns, PT Consult for assist device competence    Mentation Interventions: Bed/chair exit alarm, Adequate sleep, hydration, pain control, Door open when patient unattended, Reorient patient, Increase mobility, More frequent rounding, Update white board    Medication Interventions: Bed/chair exit alarm, Teach patient to arise slowly, Patient to call before getting OOB    Elimination Interventions: Call light in reach, Bed/chair exit alarm, Patient to call for help with toileting needs, Stay With Me (per policy), Toilet paper/wipes in reach, Toileting schedule/hourly rounds    History of Falls Interventions: Bed/chair exit alarm, Door open when patient unattended, Investigate reason for fall         Problem: Patient Education: Go to Patient Education Activity  Goal: Patient/Family Education  Outcome: Progressing Towards Goal     Problem: Patient Education: Go to Patient Education Activity  Goal: Patient/Family Education  Outcome: Progressing Towards Goal     Problem: Patient Education: Go to Patient Education Activity  Goal: Patient/Family Education  Outcome: Progressing Towards Goal     Problem: Patient Education: Go to Patient Education Activity  Goal: Patient/Family Education  Outcome: Progressing Towards Goal     Problem: Pain  Goal: *Control of Pain  Outcome: Progressing Towards Goal  Goal: *PALLIATIVE CARE:  Alleviation of Pain  Outcome: Progressing Towards Goal     Problem: Patient Education: Go to Patient Education Activity  Goal: Patient/Family Education  Outcome: Progressing Towards Goal

## 2019-11-17 NOTE — PROGRESS NOTES
Problem: Mobility Impaired (Adult and Pediatric)  Goal: *Acute Goals and Plan of Care (Insert Text)  Description  Physical Therapy Goals   Initiated 11/15/2019 and to be accomplished within 3-5 day(s)  1. Patient will move from supine <> sit with Mod I in prep for out of bed activity and change of position. 2.  Patient will perform sit<> stand with S with LRAD in prep for transfers/ambulation. 3.  Patient will transfer from bed <> chair with S with LRAD for time up in chair for completion of ADL activity. 4.  Patient will ambulate >100 feet with LRAD/S for improved functional mobility/safe discharge. Outcome: Progressing Towards Goal   PHYSICAL THERAPY TREATMENT    Patient: Ana Luisa Navarro (13 y.o. female)  Date: 11/17/2019  Diagnosis: Altered mental status [R41.82] <principal problem not specified>       Precautions: Fall   Chart, physical therapy assessment, plan of care and goals were reviewed. ASSESSMENT:  Pt supine in bed, confused, wanting to change the tv channel. Pt unwilling to participate at first, but willing after assistance with tv channel. Pt completed bed mob with SBA, STSx2 with Deandre. Once standing pt amb with HHA 15 ft and completed standing marching with GB. Pt returned to bed, all needs met, call bell within reach, bed alarm on. Progression toward goals:  ?      Improving appropriately and progressing toward goals  ? Improving slowly and progressing toward goals  ? Not making progress toward goals and plan of care will be adjusted     PLAN:  Patient continues to benefit from skilled intervention to address the above impairments. Continue treatment per established plan of care. Discharge Recommendations:  Rehab  Further Equipment Recommendations for Discharge:  N/A     SUBJECTIVE:   Patient stated I need to know how to change the channel.     OBJECTIVE DATA SUMMARY:   Critical Behavior:  Neurologic State: Alert, Confused  Orientation Level: Disoriented to place  Cognition: Impaired decision making, Follows commands, Decreased attention/concentration  Safety/Judgement: Decreased insight into deficits  Functional Mobility Training:  Bed Mobility:   Supine to Sit: Stand-by assistance  Sit to Supine: Stand-by assistance  Scooting: Stand-by assistance  Transfers:  Sit to Stand: Minimum assistance  Stand to Sit: Minimum assistance  Balance:  Sitting: Intact  Standing: Impaired  Standing - Static: Fair  Standing - Dynamic : Fair  Ambulation/Gait Training:  Distance (ft): 15 Feet (ft)  Assistive Device: Gait belt  Ambulation - Level of Assistance: Minimal assistance  Gait Abnormalities: Decreased step clearance  Base of Support: Narrowed   Speed/Kayla: Slow  Step Length: Right shortened;Left shortened  Pain:  Pain Scale 1: Numeric (0 - 10)  Pain Intensity 1: 0  Activity Tolerance:   good  Please refer to the flowsheet for vital signs taken during this treatment. After treatment:   ? Patient left in no apparent distress sitting up in chair  ? Patient left in no apparent distress in bed  ? Call bell left within reach  ? Nursing notified  ? Caregiver present  ?  Bed alarm activated      Niharika Salcedo   Time Calculation: 8 mins

## 2019-11-17 NOTE — PROGRESS NOTES
OCCUPATIONAL THERAPY TREATMENT    Problem: Self Care Deficits Care Plan (Adult)  Goal: *Acute Goals and Plan of Care (Insert Text)  Description  Occupational Therapy Goals  Initiated 11/14/2019 within 7 day(s). 1.  Patient will perform grooming tasks while standing with contact guard assistance for balance. 2.  Patient will perform lower body dressing with supervision and fair+ dynamic sitting balance. 3.  Patient will perform functional task in standing for 8 minutes with contact guard assist and minimal verbal cues for safety. 4.  Patient will perform toilet transfers with supervision/set-up. 5.  Patient will perform all aspects of toileting with supervision/set-up. 6.  Patient will participate in upper extremity therapeutic exercise/activities with supervision/set-up for 8 minutes in prep for ADLs. 11/17/2019 1359 by Kaleigh Andino  Outcome: Progressing Towards Goal  11/17/2019 1356 by Kaleigh Andino  Outcome: Progressing Towards Goal     Patient: Christi Johnson (80 y.o. female)  Date: 11/17/2019  Diagnosis: Altered mental status [R41.82] <principal problem not specified>       Precautions: Fall  PLOF: Independent living with family near by     Chart, occupational therapy assessment, plan of care, and goals were reviewed. ASSESSMENT:  Pt received supine in bed with family present, agreed to participate in therapy, she did not remember having therapy before, she presents as alert but confused of situation and reports she wants to go home. Pt able to perform supine to sit with no A. Worked on sitting balance with reaching activities, pt was able to tolerate, purpose for core control. Pt re educated on safety awareness in standing and walking with FWW, multiple repeats as pt is not able to retain these, safe sit to stand repeated and demonstrated 3x.  Pt able to perform sit to stand with min A, has tremors through UE while standing decreasing safety using FWW, pt tries to move objects out of way unsafely reaching while ambulating with walker, needs VC's to let therapist move and to focus on safe moving. Physically pt is able to perform bed to chair transfers, maintain balance while standing with no support with minimal resistance to lateral direction, main limitations are lack of insights into deficit. Discussed with family members benefits of short term rehab to gain strength and continued safety awareness. Progression toward goals:  ?          Improving appropriately and progressing toward goals  ? Improving slowly and progressing toward goals  ? Not making progress toward goals and plan of care will be adjusted     PLAN:  Patient continues to benefit from skilled intervention to address the above impairments. Continue treatment per established plan of care. Discharge Recommendations:  Moises Nelson  Further Equipment Recommendations for Discharge:  N/A     SUBJECTIVE:   Patient stated oh look the TV is on .    OBJECTIVE DATA SUMMARY:   Cognitive/Behavioral Status:  Neurologic State: Alert, Confused  Orientation Level: Disoriented to place, Disoriented to time  Cognition: Impaired decision making, Poor safety awareness  Safety/Judgement: Decreased insight into deficits    Functional Mobility and Transfers for ADLs:   Bed Mobility:     Supine to Sit: Stand-by assistance  Sit to Supine: Stand-by assistance  Scooting: Stand-by assistance   Transfers:  Sit to Stand: Minimum assistance    Balance:  Sitting: Intact  Sitting - Static: Good (unsupported)  Sitting - Dynamic: Fair (occasional)  Standing: Impaired; With support  Standing - Static: Fair  Standing - Dynamic : Fair    ADL Intervention:      Lower Body Dressing Assistance  Dressing Assistance: Contact guard assistance  Socks: Contact guard assistance  Position Performed: Seated edge of bed  Cues: Verbal cues provided    Cognitive Retraining  Orientation Retraining: Awareness of environment  Problem Solving: Awareness of environment  Executive Functions: Executing cognitive plans  Organizing/Sequencing: Two step sequence  Attention to Task: Single task  Maintains Attention For (Time): 5 minutes  Following Commands: Follows two step commands/directions  Safety/Judgement: Decreased insight into deficits  Cues: Verbal cues provided; Tactile cues provided      Pain:  Pain level pre-treatment: 0/10   Pain level post-treatment: 0/10  Pain Intervention(s): Medication administered by RN (see MAR); Rest,    Response to intervention: Nurse notified, See doc flow sheet    Activity Tolerance:    Good able to stand with lateral inputs for balance, stood for 5 minutes, minimal tremors and unsteadiness     Please refer to the flowsheet for vital signs taken during this treatment. After treatment:   ?  Patient left in no apparent distress sitting up in chair  ? Patient left in no apparent distress in bed  ? Call bell left within reach  ? Nursing notified  ? Caregiver present  ? Bed alarm activated    COMMUNICATION/EDUCATION:   ? Role of Occupational Therapy in the acute care setting  ? Home safety education was provided and the patient/caregiver indicated understanding. ? Patient/family have participated as able in working towards goals and plan of care. ? Patient/family agree to work toward stated goals and plan of care. ? Patient understands intent and goals of therapy, but is neutral about his/her participation. ? Patient is unable to participate in goal setting and plan of care.       Thank you for this referral.  Kar LECHUGA, OTR/L   Time Calculation: 24 mins

## 2019-11-17 NOTE — PROGRESS NOTES
0720:  Assumed care for patient, received bedside report from Kierra Santillan RN. Patient quietly sitting in bed watching television with no complaints of pain or discomfort at the time. NIH score is 1. Whiteboard updated, bed at the lowest position with call bell within reach.

## 2019-11-17 NOTE — ROUTINE PROCESS
Assume care of patient from [facility], [address] [place] [address]. Patient received in bed awake. Patient is alert to self only, denies pain and discomfort. No distress noted. Bed locked in low position, bed alarm activated. Call bell within reach. 2330- At bedside, patient is resting in bed very confused. After multiple attempts to encourage patient to take scheduled medications Hydrocortisone for rash on back and groin, Mirapex and Heparin to decrease risks of blood clots, patient refuses medications and vitals signs and stated, \"I am awaiting to speak with my primary care Dr. Silvia Goldberg, I do not know you or the medications you are giving me. \" Explained and educated patient that this nurse is her primary nurse for this shift, that the patient is safe and that there is a nocturnalist on for nights who can speak with her as her PCP is not available at this time. Patient continued to refused medications and interventions although patient compliant with NS flush and infusions. Called and informed Dr. Elaine Riley for MD sobia stated okay and to continue to monitor patient. 0390- Patient had incontinent episode of urine. Patient's linen changed, gown changed, patient cleaned and made comfortable. 1925- Bedside and Verbal shift change report given to ENRIQUETA Díaz (oncoming nurse) by Zelalem Oneill RN (offgoing nurse). Report included the following information SBAR, Kardex, Intake/Output, MAR and Recent Results.

## 2019-11-17 NOTE — PROGRESS NOTES
Cardiology Progress Note        Patient: Danyelle Jennings        Sex: female          DOA: 11/13/2019  YOB: 1950      Age:  77 y.o.        LOS:  LOS: 4 days    Patient seen and examined, chart reviewed. Assessment/Plan     Patient Active Problem List   Diagnosis Code    Acute encephalopathy G93.40    Sinus bradycardia R00.1    EMILI (acute kidney injury) ([facility] [address] N17.9    Generalized weakness R53.1    Elevated troponin R79.89    Alcohol abuse F10.10    Hypothermia T68. Willetta Kobs    Stroke (cerebrum) ([facility]) I63.9      Abnormal troponin, no evidence of acute coronary syndrome due to demand ischemia. Aortic aneurysm : Mildly dilated sinuses of Valsalva; diameter is 4 cm. Mildly dilated ascending aorta; diameter is 4 cm.  hypothyroidism    Plan:    Hold antihypertensive due to borderline blood pressure  Continue aspirin   She will need CTA of thoracic and abdominal aorta to evaluate aortic aneurysm. Continue management as per hospital medicine                  Subjective:    cc:  Denies any chest pain or shortness of breath      REVIEW OF SYSTEMS:     General: No fevers or chills. Cardiovascular: No chest pain,No palpitations, No orthopnea, No PND, No leg swelling, No claudication  Pulmonary: No dyspnea   Gastrointestinal: No nausea, vomiting, bleeding  Neurology: No Dizziness    Objective:      Visit Vitals  /76   Pulse 68   Temp 98 °F (36.7 °C)   Resp 13   Ht 5' 8\" (1.727 m)   Wt 65.6 kg (144 lb 11.2 oz)   SpO2 100%   BMI 22.00 kg/m²     Body mass index is 22 kg/m². Physical Exam:  General Appearance: Comfortable, not using accessory muscles of respiration. HEENT: MEGAN. HEAD: Atraumatic  NECK: No JVD, no thyroidomeglay. CAROTIDS: No bruit   LUNGS: Clear bilaterally. HEART: S1+S2 audible, no murmur, no pericardial rub. NEUROLOGICAL: Alert, follows verbal commands. .  Medication:  Current Facility-Administered Medications   Medication Dose Route Frequency    pramipexole (MIRAPEX) tablet 1 mg  1 mg Oral QHS    aspirin chewable tablet 81 mg  81 mg Oral DAILY    hydrocortisone (CORTAID) 1 % cream   Topical BID    pantoprazole (PROTONIX) tablet 40 mg  40 mg Oral ACB    0.9% sodium chloride infusion  75 mL/hr IntraVENous CONTINUOUS    acetaminophen (TYLENOL) tablet 650 mg  650 mg Oral Q4H PRN    HYDROcodone-acetaminophen (NORCO) 5-325 mg per tablet 1 Tab  1 Tab Oral Q4H PRN    naloxone (NARCAN) injection 0.4 mg  0.4 mg IntraVENous PRN    heparin (porcine) injection 5,000 Units  5,000 Units SubCUTAneous Q8H    influenza vaccine 2019-20 (6 mos+)(PF) (FLUARIX/FLULAVAL/FLUZONE QUAD) injection 0.5 mL  0.5 mL IntraMUSCular PRIOR TO DISCHARGE    sodium chloride (NS) flush 5-40 mL  5-40 mL IntraVENous Q8H    sodium chloride (NS) flush 5-40 mL  5-40 mL IntraVENous PRN    LORazepam (ATIVAN) tablet 1 mg  1 mg Oral Q1H PRN    Or    LORazepam (ATIVAN) injection 1 mg  1 mg IntraVENous Q1H PRN    LORazepam (ATIVAN) tablet 2 mg  2 mg Oral Q1H PRN    Or    LORazepam (ATIVAN) injection 2 mg  2 mg IntraVENous Q1H PRN    LORazepam (ATIVAN) injection 3 mg  3 mg IntraVENous Q15MIN PRN    buPROPion XL (WELLBUTRIN XL) tablet 300 mg  300 mg Oral 7am    levothyroxine (SYNTHROID) tablet 175 mcg  175 mcg Oral ACB               Lab/Data Reviewed:       Recent Labs     11/16/19  0130 11/15/19  0355   WBC 4.8 5.0   HGB 12.0 12.1   HCT 36.8 37.4    153     Recent Labs     11/16/19  0130 11/15/19  0355    143   K 3.7 3.6    109   CO2 25 24   GLU 80 67*   BUN 27* 38*   CREA 1.00 1.10   CA 9.2 9.1       Signed By: Tony Colbert MD     November 17, 2019

## 2019-11-18 LAB — MAGNESIUM SERPL-MCNC: 1.8 MG/DL (ref 1.6–2.6)

## 2019-11-18 PROCEDURE — 74011250636 HC RX REV CODE- 250/636: Performed by: FAMILY MEDICINE

## 2019-11-18 PROCEDURE — 36415 COLL VENOUS BLD VENIPUNCTURE: CPT

## 2019-11-18 PROCEDURE — 83735 ASSAY OF MAGNESIUM: CPT

## 2019-11-18 PROCEDURE — 74011250637 HC RX REV CODE- 250/637: Performed by: HOSPITALIST

## 2019-11-18 PROCEDURE — 97116 GAIT TRAINING THERAPY: CPT

## 2019-11-18 PROCEDURE — 74011250637 HC RX REV CODE- 250/637: Performed by: INTERNAL MEDICINE

## 2019-11-18 PROCEDURE — 65660000000 HC RM CCU STEPDOWN

## 2019-11-18 PROCEDURE — 97530 THERAPEUTIC ACTIVITIES: CPT

## 2019-11-18 PROCEDURE — 74011250637 HC RX REV CODE- 250/637: Performed by: FAMILY MEDICINE

## 2019-11-18 RX ORDER — FOLIC ACID 1 MG/1
1 TABLET ORAL DAILY
Status: DISCONTINUED | OUTPATIENT
Start: 2019-11-19 | End: 2019-11-21 | Stop reason: HOSPADM

## 2019-11-18 RX ORDER — ASPIRIN 325 MG/1
100 TABLET, FILM COATED ORAL DAILY
Status: DISCONTINUED | OUTPATIENT
Start: 2019-11-19 | End: 2019-11-21 | Stop reason: HOSPADM

## 2019-11-18 RX ADMIN — HEPARIN SODIUM 5000 UNITS: 5000 INJECTION INTRAVENOUS; SUBCUTANEOUS at 23:11

## 2019-11-18 RX ADMIN — Medication 10 ML: at 23:05

## 2019-11-18 RX ADMIN — HEPARIN SODIUM 5000 UNITS: 5000 INJECTION INTRAVENOUS; SUBCUTANEOUS at 08:36

## 2019-11-18 RX ADMIN — LEVOTHYROXINE SODIUM 175 MCG: 100 TABLET ORAL at 06:31

## 2019-11-18 RX ADMIN — BUPROPION HYDROCHLORIDE 300 MG: 150 TABLET, FILM COATED, EXTENDED RELEASE ORAL at 06:32

## 2019-11-18 RX ADMIN — PANTOPRAZOLE SODIUM 40 MG: 40 TABLET, DELAYED RELEASE ORAL at 06:32

## 2019-11-18 RX ADMIN — PRAMIPEXOLE DIHYDROCHLORIDE 1 MG: 0.25 TABLET ORAL at 23:04

## 2019-11-18 RX ADMIN — Medication 10 ML: at 06:29

## 2019-11-18 RX ADMIN — HEPARIN SODIUM 5000 UNITS: 5000 INJECTION INTRAVENOUS; SUBCUTANEOUS at 16:05

## 2019-11-18 RX ADMIN — ASPIRIN 81 MG 81 MG: 81 TABLET ORAL at 08:36

## 2019-11-18 RX ADMIN — HYDROCORTISONE: 1 CREAM TOPICAL at 23:04

## 2019-11-18 RX ADMIN — Medication 10 ML: at 14:00

## 2019-11-18 RX ADMIN — HYDROCORTISONE: 1 CREAM TOPICAL at 08:36

## 2019-11-18 NOTE — PROGRESS NOTES
Transition of care: anticipate rehab when medically cleared  Met with pt at bedside she is alert to person and place. She is willing to go to rehab when she is d/c. patient lives alone. She does have sister who lives nearby. Patient has medicare for insurance and once she is ready for d/c she will need an accepting facility and insurance authorization patient denies using DME. She has Dr. Shawanda Campo for pcp. Cm has submitted to rehabs. Will continue to follow patient may need a [facility]. Care Management Interventions  PCP Verified by CM:  Yes  Mode of Transport at Discharge: BLS  Transition of Care Consult (CM Consult): SNF  Partner SNF: Yes  Physical Therapy Consult: Yes  Occupational Therapy Consult: Yes  Current Support Network: Lives Alone, Family Lives Nearby  Confirm Follow Up Transport: Family  Plan discussed with Pt/Family/Caregiver: Yes  Freedom of Choice Offered: Yes  Discharge Location  Discharge Placement: Skilled nursing facility

## 2019-11-18 NOTE — ROUTINE PROCESS
Assume care of patient from [place], [address] [place] [address]. Patient received in bed awake. Patient is alert to self and situation, denies pain and discomfort. No distress noted. Bed locked in low position, bed alarm activated. Call bell within reach. 2102- At bedside alarmed activated, patient attempting to get out of bed w/o assistance. Patient instructed to use call bell for assistance, showed patient how to use call bell again, pt stated, \"Okay. \" Teach back method used. Patient assisted back to bed made comfortable. 2123- Patient attempting to get out of bed again. Call bell not used, noted patient pulled out IV, site dressing applied compression to site. Patient is unsteady on feet, patient assisted back to bed and educated on fall precaution and risk. No evidence of learning noted. 2138- Patient attempting to get out of bed again w/o assistance. Patient assisted back into bed. PRN ativan given. 2205- Patient sitting in bed calmly watching tv, no distress noted, will continue to monitor and keep safe. 1822- Bedside and Verbal shift change report given to [employer], RN (oncoming nurse) by Thang Harris RN (offgoing nurse). Report included the following information SBAR, Kardex, Intake/Output, MAR and Recent Results.

## 2019-11-18 NOTE — PROGRESS NOTES
Problem: Pressure Injury - Risk of  Goal: *Prevention of pressure injury  Description  Document Tremayne Scale and appropriate interventions in the flowsheet. Outcome: Progressing Towards Goal  Note:   Pressure Injury Interventions:  Sensory Interventions: Assess changes in LOC, Avoid rigorous massage over bony prominences, Keep linens dry and wrinkle-free, Pressure redistribution bed/mattress (bed type), Turn and reposition approx. every two hours (pillows and wedges if needed)    Moisture Interventions: Absorbent underpads, Check for incontinence Q2 hours and as needed, Internal/External urinary devices, Minimize layers, Moisture barrier    Activity Interventions: Increase time out of bed, Pressure redistribution bed/mattress(bed type)    Mobility Interventions: Pressure redistribution bed/mattress (bed type), HOB 30 degrees or less    Nutrition Interventions: Document food/fluid/supplement intake, Offer support with meals,snacks and hydration    Friction and Shear Interventions: Apply protective barrier, creams and emollients, Foam dressings/transparent film/skin sealants, Minimize layers                Problem: Patient Education: Go to Patient Education Activity  Goal: Patient/Family Education  Outcome: Progressing Towards Goal     Problem: Falls - Risk of  Goal: *Absence of Falls  Description  Document Gudelia Fall Risk and appropriate interventions in the flowsheet.   Outcome: Progressing Towards Goal  Note:   Fall Risk Interventions:  Mobility Interventions: Bed/chair exit alarm, Communicate number of staff needed for ambulation/transfer, Patient to call before getting OOB, Utilize walker, cane, or other assistive device    Mentation Interventions: Bed/chair exit alarm, More frequent rounding, Reorient patient, Update white board    Medication Interventions: Bed/chair exit alarm, Patient to call before getting OOB, Teach patient to arise slowly    Elimination Interventions: Bed/chair exit alarm, Call light in reach, Patient to call for help with toileting needs, Toileting schedule/hourly rounds, Toilet paper/wipes in reach    History of Falls Interventions: Bed/chair exit alarm, Investigate reason for fall         Problem: Patient Education: Go to Patient Education Activity  Goal: Patient/Family Education  Outcome: Progressing Towards Goal     Problem: Patient Education: Go to Patient Education Activity  Goal: Patient/Family Education  Outcome: Progressing Towards Goal     Problem: Patient Education: Go to Patient Education Activity  Goal: Patient/Family Education  Outcome: Progressing Towards Goal     Problem: Patient Education: Go to Patient Education Activity  Goal: Patient/Family Education  Outcome: Progressing Towards Goal     Problem: Pain  Goal: *Control of Pain  Outcome: Progressing Towards Goal  Goal: *PALLIATIVE CARE:  Alleviation of Pain  Outcome: Progressing Towards Goal     Problem: Patient Education: Go to Patient Education Activity  Goal: Patient/Family Education  Outcome: Progressing Towards Goal

## 2019-11-18 NOTE — PROGRESS NOTES
Assumed care of pt from [address]. Pt is alert no sign of distress. Call light within reach bed in lowest position. All needs addressed. 4622: Entered pt room. Pt is confused and trying to exit bed without assistance. Pt gait is very unsteady. Pt stated that pt needed to go to the kitchen and make something to eat. Pt also stated wanted to take a shower and stated she was at home. This RN called down to get pt food tray for breakfast.  1130: Pt resting no sign of distress. Call light within reach. 1254: Spoke with patient family regarding pt status. Family will be by later to see patient.

## 2019-11-18 NOTE — PROGRESS NOTES
[facility]/IP[facility] REFERRAL CONTACT NOTE  [id number] [facility]    Re: Umer Rowland Consult for IP Rehab Screen received. Will review case and advise as appropriate. Thank you for the consult.     Gaurav Ybarra

## 2019-11-18 NOTE — PROGRESS NOTES
Cardiology Progress Note        Patient: Malou Kelly        Sex: female          DOA: 11/13/2019  YOB: 1950      Age:  77 y.o.        LOS:  LOS: 5 days    Patient seen and examined, chart reviewed. Assessment/Plan     Patient Active Problem List   Diagnosis Code    Acute encephalopathy G93.40    Sinus bradycardia R00.1    EMILI (acute kidney injury) ([facility] [address] N17.9    Generalized weakness R53.1    Elevated troponin R79.89    Alcohol abuse F10.10    Hypothermia T68. Marlee Debo    Stroke (cerebrum) ([facility]) I63.9      Abnormal troponin, no evidence of acute coronary syndrome due to demand ischemia. Aortic aneurysm : Mildly dilated sinuses of Valsalva; diameter is 4 cm. Mildly dilated ascending aorta; diameter is 4 cm.  hypothyroidism    Plan:    Hold antihypertensive due to borderline blood pressure  Continue aspirin   She will need CTA of thoracic and abdominal aorta to evaluate aortic aneurysm. Continue management as per hospital medicine  Plan discussed with . Subjective:    cc:  Denies any chest pain or shortness of breath      REVIEW OF SYSTEMS:     General: No fevers or chills. Cardiovascular: No chest pain,No palpitations, No orthopnea, No PND, No leg swelling, No claudication  Pulmonary: No dyspnea   Gastrointestinal: No nausea, vomiting, bleeding  Neurology: No Dizziness    Objective:      Visit Vitals  /70 (BP 1 Location: Right arm, BP Patient Position: At rest)   Pulse 68   Temp 97.6 °F (36.4 °C)   Resp 16   Ht 5' 8\" (1.727 m)   Wt 65.3 kg (144 lb)   SpO2 100%   BMI 21.90 kg/m²     Body mass index is 21.9 kg/m². Physical Exam:  General Appearance: Comfortable, not using accessory muscles of respiration. HEENT: MEGAN. HEAD: Atraumatic  NECK: No JVD, no thyroidomeglay. CAROTIDS: No bruit   LUNGS: Clear bilaterally. HEART: S1+S2 audible, no murmur, no pericardial rub.      NEUROLOGICAL: Alert, follows verbal commands. .  Medication:  Current Facility-Administered Medications   Medication Dose Route Frequency    pramipexole (MIRAPEX) tablet 1 mg  1 mg Oral QHS    aspirin chewable tablet 81 mg  81 mg Oral DAILY    hydrocortisone (CORTAID) 1 % cream   Topical BID    pantoprazole (PROTONIX) tablet 40 mg  40 mg Oral ACB    0.9% sodium chloride infusion  75 mL/hr IntraVENous CONTINUOUS    acetaminophen (TYLENOL) tablet 650 mg  650 mg Oral Q4H PRN    HYDROcodone-acetaminophen (NORCO) 5-325 mg per tablet 1 Tab  1 Tab Oral Q4H PRN    naloxone (NARCAN) injection 0.4 mg  0.4 mg IntraVENous PRN    heparin (porcine) injection 5,000 Units  5,000 Units SubCUTAneous Q8H    influenza vaccine 2019-20 (6 mos+)(PF) (FLUARIX/FLULAVAL/FLUZONE QUAD) injection 0.5 mL  0.5 mL IntraMUSCular PRIOR TO DISCHARGE    sodium chloride (NS) flush 5-40 mL  5-40 mL IntraVENous Q8H    sodium chloride (NS) flush 5-40 mL  5-40 mL IntraVENous PRN    LORazepam (ATIVAN) tablet 1 mg  1 mg Oral Q1H PRN    Or    LORazepam (ATIVAN) injection 1 mg  1 mg IntraVENous Q1H PRN    LORazepam (ATIVAN) tablet 2 mg  2 mg Oral Q1H PRN    Or    LORazepam (ATIVAN) injection 2 mg  2 mg IntraVENous Q1H PRN    LORazepam (ATIVAN) injection 3 mg  3 mg IntraVENous Q15MIN PRN    buPROPion XL (WELLBUTRIN XL) tablet 300 mg  300 mg Oral 7am    levothyroxine (SYNTHROID) tablet 175 mcg  175 mcg Oral ACB               Lab/Data Reviewed:       Recent Labs     11/16/19 0130   WBC 4.8   HGB 12.0   HCT 36.8        Recent Labs     11/16/19 0130      K 3.7      CO2 25   GLU 80   BUN 27*   CREA 1.00   CA 9.2       Signed By: Tricia Blankenship MD     November 18, 2019

## 2019-11-18 NOTE — PROGRESS NOTES
Hospitalist Progress Note    Patient: Sánchez Dempsey MRN: [id number]  CSN: [id number]    YOB: 1950  Age: 77 y.o. Sex: female    DOA: 11/13/2019 LOS:  LOS: 5 days                Assessment/Plan     Patient Active Problem List   Diagnosis Code    Acute encephalopathy G93.40    Sinus bradycardia R00.1    EMILI (acute kidney injury) ([facility] [address] N17.9    Generalized weakness R53.1    Elevated troponin R79.89    Alcohol abuse F10.10    Hypothermia T68. Alina Carnes    Stroke (cerebrum) [facility]) I63.9            76 y/o HTN, Hypothyroidism presented after being found down on the floor for undetermined amount of time. Awake, alert and answers to questions. Acute encephalopathy -   Metabolic along with possible alcohol withdrawal and EMILI  Back to her baseline. CVA -  MRI brain with several tiny acute small vessel infarcts right parieto-occipital white matter, subacute/chronic infarcts involving bilateral thalami. 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 Continue monitor, BP< 140/90 mm Hg. Recommend She should follow up with neurologist and neurointerventionist [facility] 2 months after discharge. EMILI -   Resolved    Hypothermia -   Resolved, was on warming blanket. ETOH use -   On CIWA, started on thiamine, folic acid, multivitamin. Sinus mely with elevated troponin -   Seen by cardiology, no evidence of ACS  Echo with normal LVF, EF of 81-28%, grade 2 diastolic dysfunction. Mildly dilated ascending aorta. Cardiology recommend CTA of thoracic and abdominal aorta. Hypothyroidism -   TSH elevated, low T3, continue with synthroid.      PT/OT    Called and left message to sister [phone]    Disposition : 1-2 days    Review of systems  General: No fevers or chills. Cardiovascular: No chest pain or pressure. No palpitations. Pulmonary: No shortness of breath. Gastrointestinal: No nausea, vomiting. Physical Exam:  General: Awake, cooperative, no acute distress    HEENT: NC, Atraumatic. PERRLA, anicteric sclerae. Lungs: CTA Bilaterally. No Wheezing/Rhonchi/Rales. Heart:  S1 S2,  No murmur, No Rubs, No Gallops  Abdomen: Soft, Non distended, Non tender.  +Bowel sounds,   Extremities: No c/c/e  Psych:   Not anxious or agitated. Neurologic:  No acute neurological deficit. Vital signs/Intake and Output:  Visit Vitals  /70 (BP 1 Location: Right arm, BP Patient Position: At rest)   Pulse 68   Temp 97.6 °F (36.4 °C)   Resp 16   Ht 5' 8\" (1.727 m)   Wt 65.3 kg (144 lb)   SpO2 100%   BMI 21.90 kg/m²     Current Shift:  11/18 0701 - 11/18 1900  In: -   Out: 250 [Urine:250]  Last three shifts:  11/16 1901 - 11/18 0700  In: 2480 [P.O.:240; I.V.:2240]  Out: 310 [Urine:310]            Labs: Results:       Chemistry Recent Labs     11/16/19 0130   GLU 80      K 3.7      CO2 25   BUN 27*   CREA 1.00   CA 9.2   AGAP 8   BUCR 27*      CBC w/Diff Recent Labs     11/16/19 0130   WBC 4.8   RBC 3.91*   HGB 12.0   HCT 36.8         Cardiac Enzymes No results for input(s): CPK, CKND1, CHRISTIAN in the last 72 hours. No lab exists for component: CKRMB, TROIP   Coagulation No results for input(s): PTP, INR, APTT, INREXT in the last 72 hours. Lipid Panel Lab Results   Component Value Date/Time    Cholesterol, total 169 11/16/2019 01:30 AM    HDL Cholesterol 48 11/16/2019 01:30 AM    LDL, calculated 93 11/16/2019 01:30 AM    VLDL, calculated 28 11/16/2019 01:30 AM    Triglyceride 140 11/16/2019 01:30 AM    CHOL/HDL Ratio 3.5 11/16/2019 01:30 AM      BNP No results for input(s): BNPP in the last 72 hours. Liver Enzymes No results for input(s): TP, ALB, TBIL, AP, SGOT, GPT in the last 72 hours.     No lab exists for component: DBIL   Thyroid Studies Lab Results   Component Value Date/Time    TSH 15.50 (H) 11/13/2019 12:10 PM        Procedures/imaging: see electronic medical records for all procedures/Xrays and details which were not copied into this note but were reviewed prior to creation of Plan

## 2019-11-18 NOTE — PROGRESS NOTES
Problem: Mobility Impaired (Adult and Pediatric)  Goal: *Acute Goals and Plan of Care (Insert Text)  Description  Physical Therapy Goals   Initiated 11/15/2019 and to be accomplished within 3-5 day(s)  1. Patient will move from supine <> sit with Mod I in prep for out of bed activity and change of position. 2.  Patient will perform sit<> stand with S with LRAD in prep for transfers/ambulation. 3.  Patient will transfer from bed <> chair with S with LRAD for time up in chair for completion of ADL activity. 4.  Patient will ambulate >100 feet with LRAD/S for improved functional mobility/safe discharge. Outcome: Progressing Towards Goal   PHYSICAL THERAPY TREATMENT    Patient: Adelso Sigala (17 y.o. female)  Date: 11/18/2019  Diagnosis: Altered mental status [R41.82] <principal problem not specified>    Precautions: Fall   Chart, physical therapy assessment, plan of care and goals were reviewed. ASSESSMENT:  Pt sitting EOB with alarm activated. Pt remains confused, unable to state full date of birth. Pt performed multiple sit<>stands with RW Min A. Standing balance is unsteady requiring Min A at all times for safety. Pt has multiple LOB posterior. Extensive VCs for sequencing and RW management to amb 8' with RW. No c/o of pain. Cont POC. Progression toward goals:  ?      Improving appropriately and progressing toward goals  ? Improving slowly and progressing toward goals  ? Not making progress toward goals and plan of care will be adjusted     PLAN:  Patient continues to benefit from skilled intervention to address the above impairments. Continue treatment per established plan of care.   Discharge Recommendations:  Rehab  Further Equipment Recommendations for Discharge:  rolling walker     SUBJECTIVE:   Patient stated  I need a bath tonight      OBJECTIVE DATA SUMMARY:   Critical Behavior:  Neurologic State: Eyes open spontaneously  Orientation Level: Oriented to person  Cognition: Impaired decision making  Safety/Judgement: Decreased insight into deficits  Functional Mobility Training:  Bed Mobility:  Sit to Supine: Stand-by assistance  Scooting: Stand-by assistance    Transfers:  Sit to Stand: Minimum assistance  Stand to Sit: Minimum assistance    Balance:  Sitting: Intact  Sitting - Static: Good (unsupported)  Sitting - Dynamic: Fair (occasional)  Standing: Impaired; With support  Standing - Static: Fair  Standing - Dynamic : Fair  Ambulation/Gait Training:  Distance (ft): 8 Feet (ft)  Assistive Device: Walker, rolling;Gait belt  Ambulation - Level of Assistance: Minimal assistance  Gait Abnormalities: Decreased step clearance;Shuffling gait  Base of Support: Narrowed  Speed/Kayla: Slow  Step Length: Right shortened;Left shortened  Swing Pattern: Left asymmetrical;Right asymmetrical    Activity Tolerance:   Fair     After treatment:   ? Patient left in no apparent distress sitting up in chair  ? Patient left in no apparent distress in bed  ? Call bell left within reach  ? Nursing notified  ? Caregiver present  ?  Bed alarm activated      Carlos Carlin PTA   Time Calculation: 27 mins

## 2019-11-19 ENCOUNTER — APPOINTMENT (OUTPATIENT)
Dept: CT IMAGING | Age: 69
DRG: 064 | End: 2019-11-19
Attending: HOSPITALIST
Payer: MEDICARE

## 2019-11-19 LAB
ANION GAP SERPL CALC-SCNC: 7 MMOL/L (ref 3–18)
BUN SERPL-MCNC: 13 MG/DL (ref 7–18)
BUN/CREAT SERPL: 15 (ref 12–20)
CALCIUM SERPL-MCNC: 8.6 MG/DL (ref 8.5–10.1)
CHLORIDE SERPL-SCNC: 110 MMOL/L (ref 100–111)
CO2 SERPL-SCNC: 26 MMOL/L (ref 21–32)
CREAT SERPL-MCNC: 0.84 MG/DL (ref 0.6–1.3)
GLUCOSE SERPL-MCNC: 97 MG/DL (ref 74–99)
MAGNESIUM SERPL-MCNC: 1.7 MG/DL (ref 1.6–2.6)
POTASSIUM SERPL-SCNC: 3.5 MMOL/L (ref 3.5–5.5)
SODIUM SERPL-SCNC: 143 MMOL/L (ref 136–145)

## 2019-11-19 PROCEDURE — 71275 CT ANGIOGRAPHY CHEST: CPT

## 2019-11-19 PROCEDURE — 74011250637 HC RX REV CODE- 250/637: Performed by: FAMILY MEDICINE

## 2019-11-19 PROCEDURE — 36415 COLL VENOUS BLD VENIPUNCTURE: CPT

## 2019-11-19 PROCEDURE — 80048 BASIC METABOLIC PNL TOTAL CA: CPT

## 2019-11-19 PROCEDURE — 83735 ASSAY OF MAGNESIUM: CPT

## 2019-11-19 PROCEDURE — 74011250637 HC RX REV CODE- 250/637: Performed by: HOSPITALIST

## 2019-11-19 PROCEDURE — 92526 ORAL FUNCTION THERAPY: CPT

## 2019-11-19 PROCEDURE — 65660000000 HC RM CCU STEPDOWN

## 2019-11-19 PROCEDURE — 74011250636 HC RX REV CODE- 250/636: Performed by: FAMILY MEDICINE

## 2019-11-19 PROCEDURE — 74011636320 HC RX REV CODE- 636/320: Performed by: FAMILY MEDICINE

## 2019-11-19 RX ORDER — LANOLIN ALCOHOL/MO/W.PET/CERES
400 CREAM (GRAM) TOPICAL 2 TIMES DAILY
Status: DISCONTINUED | OUTPATIENT
Start: 2019-11-19 | End: 2019-11-21 | Stop reason: HOSPADM

## 2019-11-19 RX ADMIN — IOPAMIDOL 100 ML: 755 INJECTION, SOLUTION INTRAVENOUS at 11:39

## 2019-11-19 RX ADMIN — Medication 400 MG: at 12:10

## 2019-11-19 RX ADMIN — HEPARIN SODIUM 5000 UNITS: 5000 INJECTION INTRAVENOUS; SUBCUTANEOUS at 08:26

## 2019-11-19 RX ADMIN — Medication 10 ML: at 06:27

## 2019-11-19 RX ADMIN — PANTOPRAZOLE SODIUM 40 MG: 40 TABLET, DELAYED RELEASE ORAL at 06:32

## 2019-11-19 RX ADMIN — LEVOTHYROXINE SODIUM 175 MCG: 100 TABLET ORAL at 06:32

## 2019-11-19 NOTE — ROUTINE PROCESS
0700: received bedside shift report from Tati Simmons RN (offgoing nurse) and assumed care of the pt. Updated white board, and assessed all current needs. Bed in lowest position, bed alarm on, and call light within reach. 6603: pt is certain that she has already had too many oral medications this morning, so she refused to take anything PO. I provided education on the importance of all of these medications but pt became very agitated when I tried to offer them to her once more. Pt did let me administer her heparin after a brief explanation of why it is being prescribed to her. Pt also told me that he project is late, upon observation it seems that the pt believes that she is a character on the tv show that she is watching. Will continue to monitor, bed alarm on. 
 
1100: pt transported down for CTA via hospital bed 
 
1200: pt returned from CTA via hospital bed.  
 
1400: purposeful hourly rounding, pt sitting up in bed, all current needs assessed, bed in lowest position and call light within reach. 1600: pt's family had lots of questions about where their sister's care will go form here, I explained that rehab was on the table. I also encouraged them to be here tomorrow when the doctors round. Provided them with some reading material about stroke, and some topics they could explore in regards to her symptoms. 1900: Shift summary, shift uneventful, no complaints of chest pain or shortness of breath.   
 
Cristopher Mcclellan, RN

## 2019-11-19 NOTE — PROGRESS NOTES
Problem: Pressure Injury - Risk of  Goal: *Prevention of pressure injury  Description  Document Tremayne Scale and appropriate interventions in the flowsheet.   Outcome: Progressing Towards Goal  Note:   Pressure Injury Interventions:  Sensory Interventions: Assess changes in LOC    Moisture Interventions: Absorbent underpads    Activity Interventions: Increase time out of bed, Pressure redistribution bed/mattress(bed type), PT/OT evaluation    Mobility Interventions: HOB 30 degrees or less, Pressure redistribution bed/mattress (bed type), PT/OT evaluation    Nutrition Interventions: Document food/fluid/supplement intake    Friction and Shear Interventions: Apply protective barrier, creams and emollients, Lift sheet, HOB 30 degrees or less

## 2019-11-19 NOTE — PROGRESS NOTES
Hospitalist Progress Note    Patient: Tony Tovar MRN: [id number]  CSN: [id number]    YOB: 1950  Age: 77 y.o. Sex: female    DOA: 11/13/2019 LOS:  LOS: 6 days                Assessment/Plan     Patient Active Problem List   Diagnosis Code    Acute encephalopathy G93.40    Sinus bradycardia R00.1    EMILI (acute kidney injury) ([facility] [address] N17.9    Generalized weakness R53.1    Elevated troponin R79.89    Alcohol abuse F10.10    Hypothermia T68. Gissell Spire    Stroke (cerebrum) [facility]) I63.9            78 y/o HTN, Hypothyroidism presented after being found down on the floor for undetermined amount of time. Awake, alert and answers to questions. Acute encephalopathy -   Metabolic along with possible alcohol withdrawal and EMILI  Back to her baseline. CVA -  MRI brain with several tiny acute small vessel infarcts right parieto-occipital white matter, subacute/chronic infarcts involving bilateral thalami. 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 Continue monitor, BP< 140/90 mm Hg. Seen by neurology, Recommend She should follow up with neurologist and neurointerventionist [facility] 2 months after discharge. Continue on aspirin    EMILI -   Resolved    Hypothermia -   Resolved, was on warming blanket. ETOH use -   On CIWA, started on thiamine, folic acid, multivitamin. Sinus mely with elevated troponin -   Seen by cardiology, no evidence of ACS  Echo with normal LVF, EF of 35-39%, grade 2 diastolic dysfunction. Mildly dilated ascending aorta. Cardiology recommend CTA of thoracic and abdominal aorta. Hypothyroidism -   TSH elevated, low T3, continue with synthroid.      PT/OT recommend SNF/Rehab        Disposition : 1-2 days    Review of systems  General: No fevers or chills. Cardiovascular: No chest pain or pressure. No palpitations. Pulmonary: No shortness of breath. Gastrointestinal: No nausea, vomiting. Physical Exam:  General: Awake, cooperative, no acute distress    HEENT: NC, Atraumatic. PERRLA, anicteric sclerae. Lungs: CTA Bilaterally. No Wheezing/Rhonchi/Rales. Heart:  S1 S2,  No murmur, No Rubs, No Gallops  Abdomen: Soft, Non distended, Non tender.  +Bowel sounds,   Extremities: No c/c/e  Psych:   Not anxious or agitated. Neurologic:  No acute neurological deficit. Vital signs/Intake and Output:  Visit Vitals  BP (!) 132/91   Pulse 75   Temp 97.9 °F (36.6 °C)   Resp 14   Ht 5' 8\" (1.727 m)   Wt 67.3 kg (148 lb 6.4 oz)   SpO2 100%   BMI 22.56 kg/m²     Current Shift:  No intake/output data recorded. Last three shifts:  11/17 1901 - 11/19 0700  In: 661.3 [I.V.:661.3]  Out: 560 [Urine:560]            Labs: Results:       Chemistry Recent Labs     11/19/19  0109   GLU 97      K 3.5      CO2 26   BUN 13   CREA 0.84   CA 8.6   AGAP 7   BUCR 15      CBC w/Diff No results for input(s): WBC, RBC, HGB, HCT, PLT, GRANS, LYMPH, EOS, HGBEXT, HCTEXT, PLTEXT, HGBEXT, HCTEXT, PLTEXT in the last 72 hours. Cardiac Enzymes No results for input(s): CPK, CKND1, CHRISTIAN in the last 72 hours. No lab exists for component: CKRMB, TROIP   Coagulation No results for input(s): PTP, INR, APTT, INREXT, INREXT in the last 72 hours. Lipid Panel Lab Results   Component Value Date/Time    Cholesterol, total 169 11/16/2019 01:30 AM    HDL Cholesterol 48 11/16/2019 01:30 AM    LDL, calculated 93 11/16/2019 01:30 AM    VLDL, calculated 28 11/16/2019 01:30 AM    Triglyceride 140 11/16/2019 01:30 AM    CHOL/HDL Ratio 3.5 11/16/2019 01:30 AM      BNP No results for input(s): BNPP in the last 72 hours. Liver Enzymes No results for input(s): TP, ALB, TBIL, AP, SGOT, GPT in the last 72 hours.     No lab exists for component: DBIL   Thyroid Studies Lab Results Component Value Date/Time    TSH 15.50 (H) 11/13/2019 12:10 PM        Procedures/imaging: see electronic medical records for all procedures/Xrays and details which were not copied into this note but were reviewed prior to creation of Plan

## 2019-11-19 NOTE — PROGRESS NOTES
Transition of care: anticipate snf when medically cleared  Met with patient and Dr. Claudette Moons at bedside. Along wit patients sister. Patient does want to go to rehab. Cm has submitted. However, patient is not ready for d/c will need cta of chest. Cms will send out to [facility] due to sister states she works in [employer]. Patient has medicare for insurance and hs Dr. Silvia Goldberg for pcp. Cm will continue to follow    1330 met with sister and she reports patient will need some assistance. UAI to be completed and cm has asked [facility] to screen patient  Care Management Interventions  PCP Verified by CM:  Yes  Mode of Transport at Discharge: BLS  Transition of Care Consult (CM Consult): SNF  Partner SNF: Yes  Physical Therapy Consult: Yes  Occupational Therapy Consult: Yes  Current Support Network: Lives Alone, Family Lives Nearby  Confirm Follow Up Transport: Family  Plan discussed with Pt/Family/Caregiver: Yes  Freedom of Choice Offered: Yes  Discharge Location  Discharge Placement: Skilled nursing facility

## 2019-11-19 NOTE — ROUTINE PROCESS
0730 Bedside shift change report given to MEDARDO Brantley RN (oncoming nurse) by Raj Rosen. Carolyn Haywood RN (offgoing nurse). Report included the following information SBAR, Kardex and Intake/Output.

## 2019-11-19 NOTE — PROGRESS NOTES
Pt refused PT due to:  ? Nausea/vomiting  ? Eating  ? Pain  ? Pt lethargic  ? Off Unit  Will f/u later as schedule allows. Thank you.   Rigoberto Larsen, PTA

## 2019-11-19 NOTE — PROGRESS NOTES
Pt refused PT due to:  ? Nausea/vomiting  ? Eating  ? Pain  ? Pt lethargic  ? Family and nursing present. Pt seems agitated. \"I not getting up\" Pt remains confused and unwilling to participate. Will f/u tomorrow. Thank you.   Mellisa Reese, PTA

## 2019-11-19 NOTE — PROGRESS NOTES
Problem: Dysphagia (Adult)  Goal: *Acute Goals and Plan of Care (Insert Text)  Description  Patient will:  1. Tolerate PO trials with 0 s/s overt distress in 4/5 trials  2. Utilize compensatory swallow strategies/maneuvers (decrease bite/sip, size/rate, alt. liq/sol) with min cues in 4/5 trials    Rec:     Reg solid with thin liquids  Aspiration precautions  HOB >45 during po intake, remain >30 for 30-45 minutes after po   Small bites/sips; alternate liquid/solid with slow feeding rate   Oral care TID  Meds per pt preference   Outcome: Progressing Towards Goal     SPEECH LANGUAGE PATHOLOGY DYSPHAGIA TREATMENT    Patient: Arturo Valdez (1 y.o. female)  Date: 11/19/2019  Diagnosis: Altered mental status [R41.82] <principal problem not specified>       Precautions: Standard, Fall  PLOF:As per H&P     ASSESSMENT:  Patient seen this AM for dysphagia tx upright in bed with family present. Patient oriented to person and family members. Patient and family report toleration of diet with no s/sx of dysphagia or aspiration. Patient initially refused PO trial, but was later observed eating small candy bar and thin liquids +straw without s/sx of aspiration or dysphagia. Mild increased oral transit time, likely d/t decreased attention to task. Patient and family educated regarding safe swallow strategies to reduce risk for aspiration including small bites/sips, reduced rate, and upright positioning during PO intake. Patient continues to benefit from ST follow-up to 1-2x to further assess diet tolerance and to provide skilled education to patient/family. Progression toward goals:  ?         Improving appropriately and progressing toward goals  ? Improving slowly and progressing toward goals  ?          Not making progress toward goals and plan of care will be adjusted     PLAN:  Recommendations and Planned Interventions:  Regular solids/thin liquids  Patient continues to benefit from skilled intervention to address the above impairments. Continue treatment per established plan of care. Discharge Recommendations: To Be Determined     SUBJECTIVE:   Patient stated my sister loves to mess with me. OBJECTIVE:   Cognitive and Communication Status:  Neurologic State: Alert  Orientation Level: Oriented to person  Cognition: Decreased command following, Decreased attention/concentration, Impaired decision making, Impulsive  Perception: Appears intact  Perseveration: No perseveration noted  Safety/Judgement: Decreased insight into deficits  Dysphagia Treatment:  Oral Assessment:  Oral Assessment  Labial: No impairment  Dentition: Intact  Oral Hygiene: adequate  Lingual: No impairment  Velum: No impairment  Mandible: No impairment  P.O. Trials:   Patient Position: 45   Vocal quality prior to P.O.: No impairment   Consistency Presented: Solid, Thin liquid   How Presented: Self-fed/presented, Straw       Bolus Acceptance: No impairment   Bolus Formation/Control: No impairment       Propulsion: No impairment   Oral Residue: Less than 10% of bolus   Initiation of Swallow: Delayed (# of seconds)   Laryngeal Elevation: Functional   Aspiration Signs/Symptoms: None   Pharyngeal Phase Characteristics: No impairment, issues, or problems    Effective Modifications: None   Cues for Modifications: None         Oral Phase Severity: No impairment   Pharyngeal Phase Severity : No impairment      PAIN:  Pain level pre-treatment: 0/10   Pain level post-treatment: 0/10       After treatment:   ?              Patient left in no apparent distress sitting up in chair  ? Patient left in no apparent distress in bed  ? Call bell left within reach  ? Nursing notified  ? Family present  ? Caregiver present  ? Bed alarm activated      COMMUNICATION/EDUCATION:   ? Aspiration precautions; swallow safety; compensatory techniques  ?         Patient unable to participate in education; education ongoing with staff  ? Posted safety precautions in patient's room.   ? Oral-motor/laryngeal strengthening exercises      Sariah Regalado SLP  Time Calculation: 10 mins

## 2019-11-20 PROBLEM — I71.21 ASCENDING AORTIC ANEURYSM: Status: ACTIVE | Noted: 2019-11-20

## 2019-11-20 LAB
BACTERIA SPEC CULT: NORMAL
BACTERIA SPEC CULT: NORMAL
MAGNESIUM SERPL-MCNC: 1.5 MG/DL (ref 1.6–2.6)
SERVICE CMNT-IMP: NORMAL
SERVICE CMNT-IMP: NORMAL

## 2019-11-20 PROCEDURE — 97116 GAIT TRAINING THERAPY: CPT

## 2019-11-20 PROCEDURE — 83735 ASSAY OF MAGNESIUM: CPT

## 2019-11-20 PROCEDURE — 92526 ORAL FUNCTION THERAPY: CPT

## 2019-11-20 PROCEDURE — 97535 SELF CARE MNGMENT TRAINING: CPT

## 2019-11-20 PROCEDURE — 74011250637 HC RX REV CODE- 250/637: Performed by: INTERNAL MEDICINE

## 2019-11-20 PROCEDURE — 36415 COLL VENOUS BLD VENIPUNCTURE: CPT

## 2019-11-20 PROCEDURE — 97530 THERAPEUTIC ACTIVITIES: CPT

## 2019-11-20 PROCEDURE — 74011250637 HC RX REV CODE- 250/637: Performed by: HOSPITALIST

## 2019-11-20 PROCEDURE — 74011250636 HC RX REV CODE- 250/636: Performed by: FAMILY MEDICINE

## 2019-11-20 PROCEDURE — 65660000000 HC RM CCU STEPDOWN

## 2019-11-20 RX ADMIN — PRAMIPEXOLE DIHYDROCHLORIDE 1 MG: 0.25 TABLET ORAL at 21:00

## 2019-11-20 RX ADMIN — Medication 10 ML: at 17:16

## 2019-11-20 RX ADMIN — HYDROCORTISONE: 1 CREAM TOPICAL at 20:50

## 2019-11-20 RX ADMIN — Medication 10 ML: at 21:00

## 2019-11-20 RX ADMIN — HEPARIN SODIUM 5000 UNITS: 5000 INJECTION INTRAVENOUS; SUBCUTANEOUS at 17:16

## 2019-11-20 RX ADMIN — Medication 400 MG: at 20:48

## 2019-11-20 NOTE — PROGRESS NOTES
0730: Bedside and Verbal shift change report given to Thu Glass RN (oncoming nurse) by Keyana Lopez RN (offgoing nurse). Report included the following information Kardex, Intake/Output, MAR and Cardiac Rhythm NSR with BBB .    0900: pt refuses all a.m medications after review of txMD Garcia paged for further intervention  Thu Glass RN     1300: pt sibling at the bedside tries to encourage pt to take P.O medications, pt continues to refuse  Thu Glass RN    Progression    1900: Bedside and Verbal shift change report given to Darlene Parsons 91 (oncoming nurse) by Thu Glass RN (offgoing nurse). Report included the following information Kardex, Med Rec Status and Cardiac Rhythm NSR BBB.

## 2019-11-20 NOTE — ROUTINE PROCESS
Bedside shift change report given to Alli Hansen RN (oncoming nurse) by Eli Oden RN (offgoing nurse). Report included the following information SBAR, Kardex, Intake/Output, MAR and Cardiac Rhythm SR w/1st degree AV block. Visit Vitals /78 Pulse 72 Temp 98.3 °F (36.8 °C) Resp 14 Ht 5' 8\" (1.727 m) Wt 67.3 kg (148 lb 6.4 oz) SpO2 100% BMI 22.56 kg/m² Eli Oden RN

## 2019-11-20 NOTE — PROGRESS NOTES
ARU/IPR REFERRAL CONTACT NOTE  [id number] [facility]    Re:  Joey Nazario    Follow up on IP Consult for IP Rehab Screen. Patient does not meet criteria for admission to [facility]. Per documentation, patient has not demonstrated tolerance for acute rehabilitation level of intensity. Thank you for the consult. Should you have any questions please do not hesitate to call.      Sincerely,  [facility]  [phone]

## 2019-11-20 NOTE — PROGRESS NOTES
OCCUPATIONAL THERAPY TREATMENT    Patient: Lluvia Baugh (76 y.o. female)  Date: 11/20/2019  Diagnosis: Altered mental status [R41.82] <principal problem not specified>      Precautions: Fall  PLOF: Per patient, she was independent with ADL's PTA. Pt's sister lives near her. Chart, occupational therapy assessment, plan of care, and goals were reviewed. ASSESSMENT:  Pt received in sitting on the EOB with her sister present for session. Pt was alert and participated in cognitive tasks to challenge her memory and recall seated on EOB. Pt was orientated to the year. She thought it was July but with 2 vc's, she was able to state the correct month. Pt was able to state her birthday month and date but required extra time to identify her birth year. Pt was able to state her address correctly with prompts from OT regarding the state, city, street, and house number. She wasn't able to formulate her address without vc's. Pt wasn't able to state where her sister and brother lived. Pt required extra time to answer cognitive questions and fatigued quickly. Following cognitive activities, pt donned/doffed her socks seated on the EOB with supervision. She did a sit to stand from EOB with CGA and cues for proper hand placement/safety. Pt did sit to supine with supervision. Pt left resting in bed with call light in reach and all needs met. Pt's brother and sister present for entire session. Progression toward goals:  []          Improving appropriately and progressing toward goals  [x]          Improving slowly and progressing toward goals  []          Not making progress toward goals and plan of care will be adjusted     PLAN:  Patient continues to benefit from skilled intervention to address the above impairments. Continue treatment per established plan of care. Discharge Recommendations:  Moises Nelson  Further Equipment Recommendations for Discharge:  N/A     SUBJECTIVE:   Patient stated this is hard.  I am done with this.     OBJECTIVE DATA SUMMARY:   Cognitive/Behavioral Status:  Neurologic State: Alert  Orientation Level: Oriented to person(pt knew it was 2019 but thought it was July')  Cognition: Impaired decision making, Follows commands  Safety/Judgement: Decreased awareness of environment, Decreased insight into deficits    Functional Mobility and Transfers for ADLs:   Bed Mobility:   Sit to Supine: Supervision  Scooting: Supervision   Transfers:  Sit to Stand: Contact guard assistance    Stand to Sit: Contact guard assistance    Balance:  Sitting: Intact  Sitting - Static: Good (unsupported)  Sitting - Dynamic: Good (unsupported)  Standing: Intact; With support  Standing - Static: Fair  Standing - Dynamic : Fair    ADL Intervention:   Lower Body Dressing Assistance  Dressing Assistance: Supervision  Socks: Supervision  Leg Crossed Method Used: Yes  Position Performed: Bending forward method;Seated edge of bed    Cognitive Retraining  Orientation Retraining: Reorienting  Attention to Task: Single task  Maintains Attention For (Time): 2 minutes  Following Commands: Follows one step commands/directions  Safety/Judgement: Decreased awareness of environment;Decreased insight into deficits  Cues: Verbal cues provided    Pain:  Pain level pre-treatment: 0/10   Pain level post-treatment: 0/10  Pain Intervention(s): Medication administered by RN (see MAR); Rest, Ice, Repositioning  Response to intervention: Nurse notified, See doc flow sheet    Activity Tolerance:    Fair. Pt was able to sit on the EOB unsupported x 25 minutes with supervision during ADL tasks and cognitive tasks. Please refer to the flowsheet for vital signs taken during this treatment.   After treatment:   []  Patient left in no apparent distress sitting up in chair  [x]  Patient left in no apparent distress in bed with LE's elevated on pillow to decrease swelling  [x]  Call bell left within reach  [x]  Nursing notified  [x]  Caregiver present - brother and sister  []  Bed alarm activated    COMMUNICATION/EDUCATION:   [x] Role of Occupational Therapy in the acute care setting  [x] Home safety education was provided and the patient/caregiver indicated understanding. [x] Patient/family have participated as able in working towards goals and plan of care. [x] Patient/family agree to work toward stated goals and plan of care. [] Patient understands intent and goals of therapy, but is neutral about his/her participation. [] Patient is unable to participate in goal setting and plan of care.       Thank you for this referral.  Ximena Gamez OTR/L MOT  Time Calculation: 30 mins

## 2019-11-20 NOTE — PROGRESS NOTES
Transition of care: anticipate snf when medically cleared however do not have accepting facility at this time. Met with patient at bedside. States she is willing to go to rehab when d/c. Patient had ct of chest done. Md to review results with patient. Patient was living alone but sister does not feel that when she is d/c that would be a safe transition. She has asked cm to submit to rehabs. Does not have accepting facility will send out to  [facility] and consulate of [facility] since sister works in [employer]. Cms will place referrals as requested. Cm will continue to follow    Iliangelica 41 from [facility] and she is able to accept patient pending authorization. Cm and Christine Schwartz met with patient and her sister at bedside. They both agree to admit pending auth        Transition of care to SNF: [address]  Pending authorization  [id number] received message from Pau Puentes she has recieved authorization however, would have to be there by 17:30 .so will have to send patient tomorrow will have daughter drive her there tomorrow for d/c at 10am  Met with patient and family, and they are agreeable to the transition plan. SNF/Rehab Transition:  Patient has been accepted to [address] and meets criteria for admission. Patient will transported by sister and [facility] to [facility]. Communication to SNF/Rehab:  Bedside RN, , has been notified to update the transition plan to the facility and call report [phone]. patinam and sister are aware have authorization and sister will derive her there in am.    Discharge information has been updated on the AVS and sent via [facility] and/or CC link. Discharge instructions to be fax'd to facility at [phone] per request     Please include all hard scripts for controlled substances, med rec and dc summary, and AVS in packet.  Please medicate for pain prior to dc if possible and needed to help offset delay when patient first arrives to facility. Reviewed and confirmed with facility, [facility], can manage the patient care needs for the following:     Nenita President with (X) only those applicable:  Medication:  []Medications are available at the facility  []IV Antibiotics    []Controlled Substance  hard copies available sent. []Weekly Labs    Equipment:  []CPAP/BiPAP  []Wound Vacuum  []Prabhakar or Urinary Device  []PICC/Central Line  []Nebulizer  []Ventilator    Treatment:  []Isolation (for MRSA, VRE, etc.)  []Surgical Drain Management  []Tracheostomy Care  []Dressing Changes  []Dialysis with transportation  []PEG Care  []Oxygen  []Daily Weights for Heart Failure    Dietary:  []Any diet limitations  []Tube Feedings   []Total Parenteral Management (TPN)    Financial Resources:  []Medicaid Application Completed    []UAI Completed and copy given to pt/family    []A screening has previously been completed. []Level II Completed    [] Private pay individual who will not become financially eligible for Medicaid within 6 months from admission to a [address] [place] [address] facility. [] Individual refused to have screening conducted. []Medicaid Application Completed    []The screening denied because it was determined individual did not need/did not qualify for nursing facility level of care. [] Out of state residents seeking direct admission to a [address] facility.   [] Individuals who are inpatients of an out of state hospital, or in state or out of state veterans/ hospital and seek direct admission to a [address] facility  [] Individuals who are pateints or residents of a state owned/operated facility that is licensed by Department of [employer] and seek direct admission to [address] [place] [address]  [] A screening not required for enrollment in [address] [address] [address] services as set out in [facility] 63-  [] [facility]) staff shall perform screenings of the [facility] clients. Advanced Care Plan:  []Surrogate Decision Maker of Care  []POA  []Communicated Code Status and copy sent. Other:         Care Management Interventions  PCP Verified by CM:  Yes  Mode of Transport at Discharge: BLS  Transition of Care Consult (CM Consult): SNF  Partner SNF: Yes  Physical Therapy Consult: Yes  Occupational Therapy Consult: Yes  Current Support Network: Lives Alone, Family Lives Nearby  Confirm Follow Up Transport: Family  Plan discussed with Pt/Family/Caregiver: Yes  Freedom of Choice Offered: Yes  Discharge Location  Discharge Placement: Skilled nursing facility

## 2019-11-20 NOTE — PROGRESS NOTES
2350 Pt refused pm medications. Nurse educated pt on what mediations she was receiving and why. Pt informs \"I do not want to take any medications until I see my doctor. \"    Pt refused 1900 vital signs. Pt refused CNA for 0000 vital signs. Pt allowed nurse to take 0117 vital signs. 0750 Pt refused morning medications and informs \" I do not want to take any medications until I see my dr, Dr. Wesley Saha. She is in [place]. \"    Bedside and Verbal shift change report given to Wilman Smith RN (oncoming nurse) by Finley Dakins. Marty Jarvis RN (offgoing nurse). Report included the following information SBAR, Kardex and MAR.

## 2019-11-20 NOTE — PROGRESS NOTES
INITIAL NUTRITION ASSESSMENT     RECOMMENDATIONS/PLAN:   -Continue w/ POC  -Monitor labs/lytes, BM, PO intake, skin integrity, wt, fluid status    REASON FOR ASSESSMENT:     [x] LOS    NUTRITION ASSESSMENT:   Client History: 77 yrs old Female admitted with acute encephalopathy (back to baseline), sinus bradycardia w/ elevated troponin (ech w/ normal LVF), EMILI (reolved), generalized weakness, alcohol abuse, hypothermia (resolved), stroke   PMHx: CAD, HTN  Cultural/Lutheran Food Preferences: None Identified    FOOD/NUTRITION HISTORY  Diet History: pt reports her appetite is well    Food Allergies:  [x] NKFA     [] Yes  Pertinent PTA Medications: synthroid    NUTRITION INTAKE   Diet Order:  Regular     Average PO Intake:       Patient Vitals for the past 100 hrs:   % Diet Eaten   11/17/19 1352 0 %   11/17/19 1021 90 %   11/16/19 1338 15 %      Pertinent Medications:  [x] Reviewed; mag-ox, B1, protonix, synthroid  Electrolyte Replacement Protocol: []K  []Mg  []PO4    Insulin:  [] SSI  [] Pre-meal   []  Basal   [] Drip  [x] None  Pt expected to meet estimated nutrient needs through next review:          [x]  Yes     [] No; ANTHROPOMETRICS  Height: 5' 8\" (172.7 cm)       Weight: 68.1 kg (150 lb 2.1 oz)    BMI: 22.8 kg/m^2  -  normal weight (18.5%-24.9% BMI)        Weight change: no recent wt hx, pt reports no noticed wt loss                                 Comparison to Reference Standards:  IBW: 140 lbs      %IBW: 107%      AdjBW: n/a  NUTRITION-FOCUSED PHYSICAL ASSESSMENT  Skin: no PU   GI: +BM 11/16    BIOCHEMICAL DATA & MEDICAL TESTS  Pertinent Labs:  [x] Reviewed; magnesium- 1.5    NUTRITION PRESCRIPTION  Calories: 7618-6400 kcal/day based on 25-30 kcal/kg  Protein: 54.5 g/day based on .8 g/kg  Fluid: 0169-4397 ml/day based on 1 kcal/ml      NUTRITION DIAGNOSES:   1. At risk of inadequate energy intake related to LOS as evidenced by LOS day 7. NUTRITION INTERVENTIONS:   INTERVENTIONS:        GOALS:  1.  Continue w/ POC 1. Encourage PO intake >75% by next review 5 days     LEARNING NEEDS (Diet, Supplementation, Food/Nutrient-Drug Interaction):   [] None Identified  [] Inpatient education provided/documented    [] Identified and patient:  [] Declined     [x] Was not appropriate/indicated  NUTRITION MONITORING /EVALUATION:   Follow PO intake  Monitor wt  Monitor renal labs, electrolytes, fluid status  Monitor for additional supplement needs    [] Participated in Interdisciplinary Rounds  [x] [address] [place] [address] Reviewed/Documented  DISCHARGE NUTRITION RECOMMENDATIONS ADDRESSED:     [x] To be determined closer to discharge    NUTRITION RISK:     [x]  At risk                     []  Not currently at risk     Will follow-up per policy.   Luis Alberto Prieto 1

## 2019-11-20 NOTE — PROGRESS NOTES
Problem: Dysphagia (Adult)  Goal: *Acute Goals and Plan of Care (Insert Text)  Description  Patient will:  1. Tolerate PO trials with 0 s/s overt distress in 4/5 trials - goal met  2. Utilize compensatory swallow strategies/maneuvers (decrease bite/sip, size/rate, alt. liq/sol) with min cues in 4/5 trials - goal met    Rec:     Reg solid with thin liquids  Aspiration precautions  HOB >45 during po intake, remain >30 for 30-45 minutes after po   Small bites/sips; alternate liquid/solid with slow feeding rate   Oral care TID  Meds per pt preference    Outcome: Progressing Towards Goal    SPEECH LANGUAGE PATHOLOGY DYSPHAGIA TREATMENT & DISCHARGE    Patient: Stephanie Ruiz (38 y.o. female)  Date: 11/20/2019  Diagnosis: Altered mental status [R41.82]   <principal problem not specified>       Precautions: Aspiration Fall  PLOF: Per H&P    ASSESSMENT:  Followed up with dysphagia intervention. A&Ox2. Multiple family members present at bedside. Observed self-feeding thin liquid + straw and regular solid trials with 0 overt s/sx of aspiration. Patient and family report + tolerance of current diet. Further educated pt and family on aspiration precautions and importance of compensatory swallow techniques to decrease aspiration risk (decrease rate of intake & sip/bite size, upright @HOB for all po intake and ~30 minutes after po); verbalized comprehension. Recommend patient continue regular solid, thin liquid diet with aspiration precautions. Maximum therapeutic gains met; safest, least restrictive diet achieved in current in-patient/acute setting. Accordingly, SLP to d/c intervention at this time. Progression toward goals:  [x]         Improving appropriately - goals met/approximated  []         Not making progress/Not appropriate - will d/c POC     PLAN:  Recommendations and Planned Interventions:  Maximum therapeutic gains met; safest, least restrictive diet achieved. D/C ST intervention at this time.    Discharge Recommendations:  None     SUBJECTIVE:   Patient stated I'd like to get a dog. OBJECTIVE:   Cognitive and Communication Status:  Neurologic State: Confused  Orientation Level: Disoriented to situation  Cognition: Impaired decision making, Appropriate for age attention/concentration, Appropriate safety awareness  Perception: Appears intact  Perseveration: No perseveration noted  Safety/Judgement: Decreased insight into deficits  Dysphagia Treatment:  Oral Assessment:  Oral Assessment  Labial: No impairment  Dentition: Intact  Oral Hygiene: adequate  Lingual: No impairment  Velum: No impairment  Mandible: No impairment  P.O. Trials:   Patient Position: 45   Vocal quality prior to P.O.: No impairment   Consistency Presented: Solid, Thin liquid   How Presented: Self-fed/presented, Straw       Bolus Acceptance: No impairment   Bolus Formation/Control: No impairment       Propulsion: No impairment   Oral Residue: Less than 10% of bolus   Initiation of Swallow: Delayed (# of seconds)   Laryngeal Elevation: Functional   Aspiration Signs/Symptoms: None   Pharyngeal Phase Characteristics: No impairment, issues, or problems    Effective Modifications: None   Cues for Modifications: None         Oral Phase Severity: No impairment   Pharyngeal Phase Severity : No impairment    PAIN:  Pain level pre-treatment: 0/10   Pain level post-treatment: 0/10     After treatment:   []              Patient left in no apparent distress sitting up in chair  [x]              Patient left in no apparent distress in bed  [x]              Call bell left within reach  [x]              Nursing notified  []              Caregiver present  []              Bed alarm activated      COMMUNICATION/EDUCATION:   [x] Aspiration precautions; swallow safety; compensatory techniques  [x]        Patient unable to participate in education; education ongoing with staff  []  Posted safety precautions in patient's room.   [] Oral-motor/laryngeal strengthening exercises    Thank you for this referral,  Lanre Urrutia, SLP  Time Calculation: 10 mins

## 2019-11-20 NOTE — PROGRESS NOTES
Problem: Mobility Impaired (Adult and Pediatric)  Goal: *Acute Goals and Plan of Care (Insert Text)  Description  Physical Therapy Goals   Initiated 11/15/2019 and to be accomplished within 3-5 day(s)  1. Patient will move from supine <> sit with Mod I in prep for out of bed activity and change of position. 2.  Patient will perform sit<> stand with S with LRAD in prep for transfers/ambulation. 3.  Patient will transfer from bed <> chair with S with LRAD for time up in chair for completion of ADL activity. 4.  Patient will ambulate >100 feet with LRAD/S for improved functional mobility/safe discharge. Outcome: Progressing Towards Goal   PHYSICAL THERAPY TREATMENT    Patient: Vitaly Sharpe (52 y.o. female)  Date: 11/20/2019  Diagnosis: Altered mental status [R41.82]   <principal problem not specified>       Precautions: Fall   Chart, physical therapy assessment, plan of care and goals were reviewed. ASSESSMENT:  Pt seen sitting at the EOB prior to session. Pt reports no pain at this time. Pt is still confused but A&Ox4. Pt able to increase gait distance w/ RW but continues to be unsteady and requires assistance w/ maneuvering the RW for stability. Pt transferred back to room where pt was able to perform dynamic reaching activities and therex of the B/L LEs. Pt left sitting at the EOB after session, bed alarm donned, call bell and tray in reach, sister present in the room, nurse notified after session. Progression toward goals:  [x]      Improving appropriately and progressing toward goals  []      Improving slowly and progressing toward goals  []      Not making progress toward goals and plan of care will be adjusted     PLAN:  Patient continues to benefit from skilled intervention to address the above impairments. Continue treatment per established plan of care.   Discharge Recommendations:  Rehab  Further Equipment Recommendations for Discharge:  rolling walker     SUBJECTIVE:   Patient stated I feel good today.     OBJECTIVE DATA SUMMARY:   Critical Behavior:  Neurologic State: Alert  Orientation Level: Oriented to person(pt knew it was 2019 but thought it was July')  Cognition: Impaired decision making, Follows commands  Safety/Judgement: Decreased awareness of environment, Decreased insight into deficits  Functional Mobility Training:  Bed Mobility:  Sit to Supine: Supervision  Scooting: Supervision  Transfers:  Sit to Stand: Contact guard assistance  Stand to Sit: Contact guard assistance  Balance:  Sitting: Intact  Sitting - Static: Good (unsupported)  Sitting - Dynamic: Good (unsupported)  Standing: Intact; With support  Standing - Static: Fair  Standing - Dynamic : Fair  Ambulation/Gait Training:  Distance (ft): 35 Feet (ft)  Assistive Device: Gait belt;Walker, rolling  Ambulation - Level of Assistance: Minimal assistance  Gait Abnormalities: Decreased step clearance  Base of Support: Narrowed  Speed/Kayla: Slow  Step Length: Left shortened;Right shortened  Swing Pattern: Left asymmetrical;Right asymmetrical  Therapeutic Exercises:       EXERCISE   Sets   Reps   Active Active Assist   Passive Self ROM   Comments   Ankle Pumps    [] [] [] []    Quad Sets/Glut Sets   [] [] [] []    Hamstring Sets   [] [] [] []    Short Arc Quads   [] [] [] []    Heel Slides   [] [] [] []    Straight Leg Raises   [] [] [] []    Hip Abd/Add 2  [x] [] [] [] Hold for 3 secs   Long Arc Quads 2 10 [x] [] [] [] Hold for 3 secs   Seated Marching 2 10 [x] [] [] []    Standing Marching   [] [] [] []       [] [] [] []       Pain:  Pain Scale 1: Numeric (0 - 10)  Pain Intensity 1: 0  Activity Tolerance:   Good  Please refer to the flowsheet for vital signs taken during this treatment.   After treatment:   [] Patient left in no apparent distress sitting up in chair  [x] Patient left in no apparent distress in bed  [x] Call bell left within reach  [x] Nursing notified  [] Caregiver present  [x] Bed alarm activated      Cynthia Russ Archie Rosenthal, PT   Time Calculation: 23 mins

## 2019-11-20 NOTE — PROGRESS NOTES
Problem: Pressure Injury - Risk of  Goal: *Prevention of pressure injury  Description  Document Tremayne Scale and appropriate interventions in the flowsheet. Outcome: Progressing Towards Goal  Note: Pressure Injury Interventions:  Sensory Interventions: Avoid rigorous massage over bony prominences, Assess need for specialty bed, Assess changes in LOC, Chair cushion, Check visual cues for pain, Discuss PT/OT consult with provider, Float heels, Keep linens dry and wrinkle-free, Maintain/enhance activity level, Minimize linen layers, Monitor skin under medical devices, Pad between skin to skin, Pressure redistribution bed/mattress (bed type), Turn and reposition approx.  every two hours (pillows and wedges if needed)    Moisture Interventions: Absorbent underpads, Assess need for specialty bed, Check for incontinence Q2 hours and as needed, Limit adult briefs, Maintain skin hydration (lotion/cream), Minimize layers, Moisture barrier, Offer toileting Q_hr    Activity Interventions: Assess need for specialty bed, Chair cushion, Increase time out of bed, Pressure redistribution bed/mattress(bed type), PT/OT evaluation    Mobility Interventions: Assess need for specialty bed, Chair cushion, Float heels, Pressure redistribution bed/mattress (bed type), PT/OT evaluation, Trapeze to reposition    Nutrition Interventions: Document food/fluid/supplement intake, Discuss nutritional consult with provider, Offer support with meals,snacks and hydration    Friction and Shear Interventions: Feet elevated on foot rest, Foam dressings/transparent film/skin sealants, Lift sheet, Lift team/patient mobility team, Minimize layers, Transferring/repositioning devices, Transfer aides:transfer board/Reinaldo lift/ceiling lift                Problem: Patient Education: Go to Patient Education Activity  Goal: Patient/Family Education  Outcome: Progressing Towards Goal     Problem: Falls - Risk of  Goal: *Absence of Falls  Description  Document Gudelia Fall Risk and appropriate interventions in the flowsheet.   Outcome: Progressing Towards Goal  Note: Fall Risk Interventions:  Mobility Interventions: Bed/chair exit alarm, Assess mobility with egress test, Communicate number of staff needed for ambulation/transfer, OT consult for ADLs, Patient to call before getting OOB, PT Consult for assist device competence, PT Consult for mobility concerns, Strengthening exercises (ROM-active/passive), Utilize walker, cane, or other assistive device    Mentation Interventions: Bed/chair exit alarm, Door open when patient unattended, Evaluate medications/consider consulting pharmacy, Increase mobility, More frequent rounding, Reorient patient, Room close to nurse's station, Toileting rounds, Update white board    Medication Interventions: Bed/chair exit alarm, Evaluate medications/consider consulting pharmacy, Patient to call before getting OOB, Teach patient to arise slowly    Elimination Interventions: Bed/chair exit alarm, Call light in reach, Elevated toilet seat, Patient to call for help with toileting needs, Stay With Me (per policy), Toilet paper/wipes in reach, Toileting schedule/hourly rounds    History of Falls Interventions: Bed/chair exit alarm, Consult care management for discharge planning, Door open when patient unattended, Evaluate medications/consider consulting pharmacy, Room close to nurse's station, Utilize gait belt for transfer/ambulation, Assess for delayed presentation/identification of injury for 48 hrs (comment for end date), Vital signs minimum Q4HRs X 24 hrs (comment for end date)         Problem: Patient Education: Go to Patient Education Activity  Goal: Patient/Family Education  Outcome: Progressing Towards Goal     Problem: Patient Education: Go to Patient Education Activity  Goal: Patient/Family Education  Outcome: Progressing Towards Goal     Problem: Patient Education: Go to Patient Education Activity  Goal: Patient/Family Education  Outcome: Progressing Towards Goal

## 2019-11-21 VITALS
WEIGHT: 155.2 LBS | DIASTOLIC BLOOD PRESSURE: 82 MMHG | OXYGEN SATURATION: 100 % | TEMPERATURE: 98 F | BODY MASS INDEX: 23.52 KG/M2 | RESPIRATION RATE: 18 BRPM | HEART RATE: 64 BPM | HEIGHT: 68 IN | SYSTOLIC BLOOD PRESSURE: 112 MMHG

## 2019-11-21 LAB — MAGNESIUM SERPL-MCNC: 1.6 MG/DL (ref 1.6–2.6)

## 2019-11-21 PROCEDURE — 74011250637 HC RX REV CODE- 250/637: Performed by: HOSPITALIST

## 2019-11-21 PROCEDURE — 97530 THERAPEUTIC ACTIVITIES: CPT

## 2019-11-21 PROCEDURE — 83735 ASSAY OF MAGNESIUM: CPT

## 2019-11-21 PROCEDURE — 97116 GAIT TRAINING THERAPY: CPT

## 2019-11-21 PROCEDURE — 36415 COLL VENOUS BLD VENIPUNCTURE: CPT

## 2019-11-21 PROCEDURE — 74011250637 HC RX REV CODE- 250/637: Performed by: FAMILY MEDICINE

## 2019-11-21 PROCEDURE — 74011250636 HC RX REV CODE- 250/636: Performed by: FAMILY MEDICINE

## 2019-11-21 PROCEDURE — 74011250637 HC RX REV CODE- 250/637: Performed by: INTERNAL MEDICINE

## 2019-11-21 RX ORDER — GUAIFENESIN 100 MG/5ML
81 LIQUID (ML) ORAL DAILY
Qty: 30 TAB | Refills: 0 | Status: SHIPPED
Start: 2019-11-22

## 2019-11-21 RX ORDER — FOLIC ACID 1 MG/1
1 TABLET ORAL DAILY
Qty: 30 TAB | Refills: 0 | Status: SHIPPED
Start: 2019-11-22

## 2019-11-21 RX ORDER — LANOLIN ALCOHOL/MO/W.PET/CERES
400 CREAM (GRAM) TOPICAL 2 TIMES DAILY
Qty: 30 TAB | Refills: 0 | Status: SHIPPED
Start: 2019-11-21

## 2019-11-21 RX ORDER — ASPIRIN 325 MG/1
100 TABLET, FILM COATED ORAL DAILY
Qty: 30 TAB | Refills: 0 | Status: SHIPPED
Start: 2019-11-22

## 2019-11-21 RX ADMIN — HYDROCORTISONE: 1 CREAM TOPICAL at 08:28

## 2019-11-21 RX ADMIN — PANTOPRAZOLE SODIUM 40 MG: 40 TABLET, DELAYED RELEASE ORAL at 06:22

## 2019-11-21 RX ADMIN — Medication 100 MG: at 08:27

## 2019-11-21 RX ADMIN — FOLIC ACID 1 MG: 1 TABLET ORAL at 08:27

## 2019-11-21 RX ADMIN — HEPARIN SODIUM 5000 UNITS: 5000 INJECTION INTRAVENOUS; SUBCUTANEOUS at 08:26

## 2019-11-21 RX ADMIN — LEVOTHYROXINE SODIUM 175 MCG: 100 TABLET ORAL at 06:22

## 2019-11-21 RX ADMIN — ASPIRIN 81 MG 81 MG: 81 TABLET ORAL at 08:27

## 2019-11-21 RX ADMIN — Medication 10 ML: at 06:21

## 2019-11-21 RX ADMIN — Medication 400 MG: at 08:27

## 2019-11-21 RX ADMIN — MULTIPLE VITAMINS W/ MINERALS TAB 1 TABLET: TAB at 08:27

## 2019-11-21 RX ADMIN — BUPROPION HYDROCHLORIDE 300 MG: 150 TABLET, FILM COATED, EXTENDED RELEASE ORAL at 06:21

## 2019-11-21 NOTE — PROGRESS NOTES
Problem: Pressure Injury - Risk of  Goal: *Prevention of pressure injury  Description  Document Tremayne Scale and appropriate interventions in the flowsheet. Outcome: Progressing Towards Goal  Note: Pressure Injury Interventions:  Sensory Interventions: Assess changes in LOC, Discuss PT/OT consult with provider    Moisture Interventions: Absorbent underpads, Check for incontinence Q2 hours and as needed    Activity Interventions: Pressure redistribution bed/mattress(bed type), PT/OT evaluation    Mobility Interventions: Pressure redistribution bed/mattress (bed type), PT/OT evaluation    Nutrition Interventions: Document food/fluid/supplement intake    Friction and Shear Interventions: HOB 30 degrees or less                Problem: Patient Education: Go to Patient Education Activity  Goal: Patient/Family Education  Outcome: Progressing Towards Goal     Problem: Falls - Risk of  Goal: *Absence of Falls  Description  Document Gudelia Fall Risk and appropriate interventions in the flowsheet.   Outcome: Progressing Towards Goal  Note: Fall Risk Interventions:  Mobility Interventions: Patient to call before getting OOB    Mentation Interventions: Door open when patient unattended    Medication Interventions: Patient to call before getting OOB    Elimination Interventions: Call light in reach    History of Falls Interventions: Door open when patient unattended, Bed/chair exit alarm         Problem: Patient Education: Go to Patient Education Activity  Goal: Patient/Family Education  Outcome: Progressing Towards Goal     Problem: Patient Education: Go to Patient Education Activity  Goal: Patient/Family Education  Outcome: Progressing Towards Goal     Problem: Patient Education: Go to Patient Education Activity  Goal: Patient/Family Education  Outcome: Progressing Towards Goal     Problem: Patient Education: Go to Patient Education Activity  Goal: Patient/Family Education  Outcome: Progressing Towards Goal     Problem: Pain  Goal: *Control of Pain  Outcome: Progressing Towards Goal  Goal: *PALLIATIVE CARE:  Alleviation of Pain  Outcome: Progressing Towards Goal     Problem: Patient Education: Go to Patient Education Activity  Goal: Patient/Family Education  Outcome: Progressing Towards Goal     Problem: Patient Education: Go to Patient Education Activity  Goal: Patient/Family Education  Outcome: Progressing Towards Goal     Problem: TIA/CVA Stroke: Day 2 Until Discharge  Goal: Off Pathway (Use only if patient is Off Pathway)  Outcome: Progressing Towards Goal  Goal: Activity/Safety  Outcome: Progressing Towards Goal  Goal: Diagnostic Test/Procedures  Outcome: Progressing Towards Goal  Goal: Nutrition/Diet  Outcome: Progressing Towards Goal  Goal: Discharge Planning  Outcome: Progressing Towards Goal  Goal: Medications  Outcome: Progressing Towards Goal  Goal: Respiratory  Outcome: Progressing Towards Goal  Goal: Treatments/Interventions/Procedures  Outcome: Progressing Towards Goal  Goal: Psychosocial  Outcome: Progressing Towards Goal  Goal: *Verbalizes anxiety and depression are reduced or absent  Outcome: Progressing Towards Goal  Goal: *Absence of aspiration  Outcome: Progressing Towards Goal  Goal: *Absence of deep venous thrombosis signs and symptoms(Stroke Metric)  Outcome: Progressing Towards Goal  Goal: *Optimal pain control at patient's stated goal  Outcome: Progressing Towards Goal  Goal: *Tolerating diet  Outcome: Progressing Towards Goal  Goal: *Ability to perform ADLs and demonstrates progressive mobility and function  Outcome: Progressing Towards Goal  Goal: *Stroke education continued(Stroke Metric)  Outcome: Progressing Towards Goal     Problem: Ischemic Stroke: Discharge Outcomes  Goal: *Verbalizes anxiety and depression are reduced or absent  Outcome: Progressing Towards Goal  Goal: *Verbalize understanding of risk factor modification(Stroke Metric)  Outcome: Progressing Towards Goal  Goal: *Hemodynamically stable  Outcome: Progressing Towards Goal  Goal: *Absence of aspiration pneumonia  Outcome: Progressing Towards Goal  Goal: *Aware of needed dietary changes  Outcome: Progressing Towards Goal  Goal: *Verbalize understanding of prescribed medications including anti-coagulants, anti-lipid, and/or anti-platelets(Stroke Metric)  Outcome: Progressing Towards Goal  Goal: *Tolerating diet  Outcome: Progressing Towards Goal  Goal: *Aware of follow-up diagnostics related to anticoagulants  Outcome: Progressing Towards Goal  Goal: *Ability to perform ADLs and demonstrates progressive mobility and function  Outcome: Progressing Towards Goal  Goal: *Absence of DVT(Stroke Metric)  Outcome: Progressing Towards Goal  Goal: *Absence of aspiration  Outcome: Progressing Towards Goal  Goal: *Optimal pain control at patient's stated goal  Outcome: Progressing Towards Goal  Goal: *Home safety concerns addressed  Outcome: Progressing Towards Goal  Goal: *Describes available resources and support systems  Outcome: Progressing Towards Goal  Goal: *Verbalizes understanding of activation of EMS(911) for stroke symptoms(Stroke Metric)  Outcome: Progressing Towards Goal  Goal: *Understands and describes signs and symptoms to report to providers(Stroke Metric)  Outcome: Progressing Towards Goal  Goal: *Neurolgocially stable (absence of additional neurological deficits)  Outcome: Progressing Towards Goal  Goal: *Verbalizes importance of follow-up with primary care physician(Stroke Metric)  Outcome: Progressing Towards Goal  Goal: *Smoking cessation discussed,if applicable(Stroke Metric)  Outcome: Progressing Towards Goal  Goal: *Depression screening completed(Stroke Metric)  Outcome: Progressing Towards Goal

## 2019-11-21 NOTE — DISCHARGE SUMMARY
Discharge Summary    Patient: Davina Rodriguez MRN: [id number]  CSN: [id number]    YOB: 1950  Age: 77 y.o. Sex: female    DOA: 11/13/2019 LOS:  LOS: 8 days   Discharge Date:      Primary Care Provider:  Evin Boyd MD    Admission Diagnoses: Altered mental status [R41.82]    Discharge Diagnoses:    Problem List as of 11/21/2019 Never Reviewed          Codes Class Noted - Resolved    Ascending aortic aneurysm ([facility] [place] [facility] ICD-10-CM: I71.2  ICD-9-CM: 441.2  11/20/2019 - Present        * (Principal) Stroke (cerebrum) ([facility] [place] [facility] ICD-10-CM: I63.9  ICD-9-CM: 434.91  11/15/2019 - Present        Alcohol abuse ICD-10-CM: F10.10  ICD-9-CM: 305.00  11/14/2019 - Present        Hypothermia ICD-10-CM: T68. Ferdinand Esmer  ICD-9-CM: 991.6  11/14/2019 - Present        Acute encephalopathy ICD-10-CM: G93.40  ICD-9-CM: 348.30  11/13/2019 - Present        Sinus bradycardia ICD-10-CM: R00.1  ICD-9-CM: 427.89  11/13/2019 - Present        EMILI (acute kidney injury) ([facility] [place] [facility] ICD-10-CM: N17.9  ICD-9-CM: 584.9  11/13/2019 - Present        Generalized weakness ICD-10-CM: R53.1  ICD-9-CM: 780.79  11/13/2019 - Present        Elevated troponin ICD-10-CM: R79.89  ICD-9-CM: 790.6  11/13/2019 - Present              Discharge Medications:     Current Discharge Medication List      START taking these medications    Details   aspirin 81 mg chewable tablet Take 1 Tab by mouth daily. Qty: 30 Tab, Refills: 0      folic acid (FOLVITE) 1 mg tablet Take 1 Tab by mouth daily. Qty: 30 Tab, Refills: 0      magnesium oxide (MAG-OX) 400 mg tablet Take 1 Tab by mouth two (2) times a day. Qty: 30 Tab, Refills: 0      thiamine mononitrate (B-1) 100 mg tablet Take 1 Tab by mouth daily. Qty: 30 Tab, Refills: 0      multivitamin, tx-iron-ca-min (THERA-M W/ IRON) 9 mg iron-400 mcg tab tablet Take 1 Tab by mouth daily.   Qty: 30 Tab, Refills: 0         CONTINUE these medications which have NOT CHANGED    Details   pramipexole (MIRAPEX) 1 mg tablet Take 1 mg by mouth three (3) times daily. levothyroxine (SYNTHROID) 112 mcg tablet Take  by mouth Daily (before breakfast). ergocalciferol (ERGOCALCIFEROL) 50,000 unit capsule Take 50,000 Units by mouth. buPROPion SR (WELLBUTRIN SR) 100 mg SR tablet Take 100 mg by mouth. STOP taking these medications       valsartan-hydrochlorothiazide (DIOVAN-HCT) 80-12.5 mg per tablet Comments:   Reason for Stopping:         naproxen (NAPROSYN) 375 mg tablet Comments:   Reason for Stopping:               Discharge Condition: Good    Procedures : None    Consults: Cardiology and Neurology      PHYSICAL EXAM   Visit Vitals  /82   Pulse 64   Temp 98 °F (36.7 °C)   Resp 18   Ht 5' 8\" (1.727 m)   Wt 70.4 kg (155 lb 3.2 oz)   SpO2 100%   BMI 23.60 kg/m²     General: Awake, cooperative, no acute distress    HEENT: NC, Atraumatic. PERRLA, EOMI. Anicteric sclerae. Lungs:  CTA Bilaterally. No Wheezing/Rhonchi/Rales. Heart:  Regular  rhythm,  No murmur, No Rubs, No Gallops  Abdomen: Soft, Non distended, Non tender. +Bowel sounds,   Extremities: No c/c/e  Psych:   Not anxious or agitated. Neurologic:  No acute neurological deficits. Admission HPI :   Narendra Patterson is a 77 y.o. female with PMHX of alcohol use, thyroid dysfunction, hypertension, heart disease who presents to the emergency department C/O altered mental status.  Per EMS report they were called for a welfare check as the patient's contacted her brother who lives in [place] and he called the police to do a welfare check because no one had seen the patient in over a week. The patient's brother called the patient's sister who is at the bedside now and she met the police at her sister's house.   Patient sister stated that she had not heard from her sister and approximately 2 weeks except for weird message asking for her to text her. Radha Rosas their arrival they found the patient lying on the floor and confused. Markie Gutierrez states she was oriented to self and place.  The sister had noted that she does not normally have changes in mental status. Joaquin Erps deny any obvious physical injuries.  State the patient was slightly bradycardic with heart rate in the mid 50s with the remainder of her vital signs being normal.  Patient states she is unsure how long she has been on the floor.  She does admit to some intermittent mild blurry vision.  She denies any chest pain, shortness of breath, abdominal pain, nausea, vomiting, headache, back pain, hip pain.  She states she has not been taking her medicines as she has not been given a specific regimen. Sister states that the patient had been complaining of excessive fatigue and dizziness several weeks ago. She is also concerned that her sister may be drinking alcohol excessively and not eating. Hospital Course :   Ms. Jt Pino was admitted to telemetry she was seen and followed by neurology and cardiology, she did not had any acute events during hospitalization. Acute encephalopathy-  Multifactorial with metabolic, CVA, alcohol withdrawal contributing. Per sister now her mental status back to her baseline. CVA -  MRI brain with several tiny acute small vessel infarcts right parieto-occipital white matter, subacute/chronic infarcts involving bilateral thalami. CTA head and neck with no high grade stenosis, except 2-3 mm diameter saccular aneurysm distal right supraclinoid ICA, intradural, with 2-3 mm diameter extradural saccular aneurysm horizontal right cavernous ICA, 1 mm diameter saccular aneurysm horizontal left cavernous ICA. Continue monitor, BP< 140/90 mm Hg. Seen by neurology, Recommend She should follow up with neurologist and neurointerventionist [facility] 2 months after discharge.   Started on aspirin. Cannot start on statin as she is allergic to Lipitor. Acute kidney injury-  Prerenal etiology, resolved with gentle IVF. Hypothermia-  Resolved with warming blanket.     EtOH use-  Started on CIWA protocol, also started on thiamine, folic acid, multivitamin. Will discharge on thiamine, folic acid and multivitamin. Advised patient to stop drinking. HTN-  Her blood pressure on lower side, her BP medication was held. Her blood pressure has been in the lower side during hospitalization. We will stop her blood pressure medication at discharge. It can be started back after following up with her PCP and if her blood pressures are higher. Sinus bradycardia with elevated troponin-  Seen by cardiology, no evidence of ACS. Echocardiogram done showed normal left ventricular function with ejection fraction of 61 to 93%, grade 2 diastolic dysfunction. Also noted in the echo was mildly dilated ascending aorta. Cardiology recommended getting CTA of chest and abdomen to rule out thoracic and abdominal aortic aneurysm. CTA of chest and abdomen done to look for thoracic and abdominal aortic aneurysm. However CTA showed only ascending aortic aneurysm measuring 4.6 cm. No aneurysm of aorta are observed in the thoracic or abdominal region. CTA also showed that there was duodenal wall thickening, recommended EGD as outpatient to further evaluate this. Also she needs follow up with cardiology for follow up on her ascending aortic aneurysm. This was discussed with patient her sister and her brother. Hypothyroidism-  Her TSH elevated and her T3 low likely secondary to noncompliance with the medication. Started her on her Synthroid. Recommend follow-up on her TSH level and adjust dose of Synthroid accordingly. Patient worked with PT/OT and SNF/rehab recommended. Patient will be discharged to rehab facility.     Activity: Activity as tolerated    Diet: Regular Diet    Follow-up: PCP, cardiology, GI, neurology    Disposition: Rehab    Minutes spent on discharge: 45       Labs: Results:       Chemistry Recent Labs     11/19/19  0109   GLU 97      K 3.5      CO2 26   BUN 13 CREA 0.84   CA 8.6   AGAP 7   BUCR 15      CBC w/Diff No results for input(s): WBC, RBC, HGB, HCT, PLT, GRANS, LYMPH, EOS, HGBEXT, HCTEXT, PLTEXT in the last 72 hours. Cardiac Enzymes No results for input(s): CPK, CKND1, CHRISTIAN in the last 72 hours. No lab exists for component: CKRMB, TROIP   Coagulation No results for input(s): PTP, INR, APTT, INREXT in the last 72 hours. Lipid Panel Lab Results   Component Value Date/Time    Cholesterol, total 169 11/16/2019 01:30 AM    HDL Cholesterol 48 11/16/2019 01:30 AM    LDL, calculated 93 11/16/2019 01:30 AM    VLDL, calculated 28 11/16/2019 01:30 AM    Triglyceride 140 11/16/2019 01:30 AM    CHOL/HDL Ratio 3.5 11/16/2019 01:30 AM      BNP No results for input(s): BNPP in the last 72 hours. Liver Enzymes No results for input(s): TP, ALB, TBIL, AP, SGOT, GPT in the last 72 hours. No lab exists for component: DBIL   Thyroid Studies Lab Results   Component Value Date/Time    TSH 15.50 (H) 11/13/2019 12:10 PM            Significant Diagnostic Studies: Mri Brain Wo Cont    Result Date: 11/14/2019  EXAM: MRI brain without gadolinium CLINICAL INDICATION/HISTORY: ams   > Additional: Altered level of consciousness, found down, metabolic encephalopathy COMPARISON: None. > Reference Exam: CT head 11/13/2019 TECHNIQUE: Sagittal T1, axial T1, T2, FLAIR, T2 gradient, diffusion and coronal T1 and T2 sequences obtained of the brain. _______________ FINDINGS: Diffusion:  Several tiny foci of restricted diffusion signal in the right parieto-occipital subcortical white matter. Additional mild symmetric confluent increased diffusion signal medial bilateral thalami without definite low ADC map. Brain parenchyma:  Mild to moderate confluent and multifocal increased T2 and FLAIR signal periventricular and deep cerebral white matter nonspecific with minimal increased FLAIR signal central sandra. Mild symmetric confluent increased T2 and FLAIR signal medial bilateral thalami.  No cortical signal abnormality. No evidence of mass hemorrhage or mass effect. Ventricles and sulci:  Mild diffuse central and cortical volume loss. Extra-axial:  No extra-axial fluid collection or mass is noted. Brain vasculature:  No vascular abnormality is appreciated on this routine brain MR examination. Craniocervical junction:  Normal. Skull base, extracranial and calvarium:  There is a lobulated heterogeneous low T1 and heterogeneous mildly increased T2 signal lesion along the floor of the right maxillary sinus 18 mm in diameter that appears to involve the periapical region of first right maxillary molar. Remainder sinuses clear. Orbits IACs and mastoids sella and skull unremarkable. _______________     IMPRESSION: 1. Several tiny acute small vessel infarcts right parieto-occipital white matter. 2. Symmetric confluent T2 hyperintensity medial bilateral thalami with increased diffusion signal but no definite low ADC map, suggestive of late subacute/chronic infarcts and/or ischemic change. 3. Mild to moderate cerebral white matter signal changes nonspecific likely chronic microvascular ischemic disease. 4. 18 mm lobulated soft tissue lesion floor of right maxillary sinus that appears to involve right first maxillary molar periapical region. Further evaluation with sinus CT recommended. Ct Head Wo Cont    Result Date: 11/13/2019  EXAM: CRANIAL CT WITHOUT CONTRAST INDICATION: Altered level of consciousness. Possible injury. COMPARISON: None. TECHNIQUE: Axial CT imaging of the head was performed without intravenous contrast. One or more dose reduction techniques were used on this CT: automated exposure control, adjustment of the mAs and/or kVp according to patient size, and iterative reconstruction techniques. The specific techniques used on this CT exam have been documented in the patient's electronic medical record.  Digital Imaging and Communications in Medicine (DICOM) format image data are available to nonaffiliated external healthcare facilities or entities on a secure, media free, reciprocally searchable basis with patient authorization for at least a 12-month period after this study. _______________ FINDINGS: BRAIN AND POSTERIOR FOSSA: Ventricular system is midline. There is no intracranial hemorrhage, mass effect, or midline shift. Low-attenuation areas deep white matter most compatible with chronic microvascular insufficiency. Prominent cortical and cerebellar volume loss advanced for age. EXTRA-AXIAL SPACES AND MENINGES: There are no abnormal extra-axial fluid collections. CALVARIUM: Intact. SINUSES: Clear. OTHER: None. _______________     IMPRESSION: No acute intracranial abnormalities. Chronic microvascular insufficiency deep white matter. Cortical and cerebellar volume loss advanced for age. Cta Head Neck W Wo Cont    Result Date: 11/15/2019  EXAM:  CT angiogram of the head and neck with intravenous contrast. CLINICAL INDICATION/HISTORY:Acute visual deficit, encephalopathy, small acute right parieto-occipital white matter infarcts. COMPARISON: Correlation brain MRI 11/14/2019 TECHNIQUE:  Following IV administration of nonionic contrast, helical CTA head and neck performed. Three-dimensional, maximum intensity projection, and curved planar reformations were post-processed at a dedicated outside facility ([employer]). Stenoses are reported with reference to the downstream lumen (\"NASCET\"). One or more dose reduction techniques were used on this CT: automated exposure control, adjustment of the mAs and/or kVp according to patient's size, and iterative reconstruction techniques. The specific techniques utilized on this CT exam have been documented in the patient's electronic medical record.   Digital imaging and communications and medicine (DICOM) format image data are available to nonaffiliated external healthcare facilities or entities on a secure, media free, reciprocally searchable basis with patient authorization for at least a 12 month period after this study. _______________ FINDINGS: NECK CTA:      ARTERIAL BOLUS QUALITY:  Satisfactory. AORTIC ARCH: Normal branching pattern. No proximal great vessel stenosis. RIGHT CAROTID ARTERY:  No significant common carotid or internal carotid artery stenosis, 0% diameter stenosis proximal ICA by NASCET criteria. LEFT CAROTID ARTERY:  No significant common carotid or internal carotid artery stenosis, 0% diameter stenosis proximal ICA by NASCET criteria. VERTEBRAL ARTERIES: The vertebral arteries areright side dominant. RIGHT VERTEBRAL ARTERY:  No stenosis or other vascular abnormality. LEFT VERTEBRAL ARTERY:  No stenosis or other vascular abnormality. LUNG APICES: No mass. NECK SOFT TISSUES: No significant neck soft tissue abnormality. OSSEOUS: Degenerative spondylosis, no high-grade canal stenosis. BRAIN CTA:      CAROTID SIPHON AND SUPRACLINOID ICA: Mild atherosclerotic changes bilaterally with mild to moderate atherosclerotic stenosis right supraclinoid ICA. 2-3 mm diameter outpouching of contrast distal right supraclinoid ICA with nonvisualization posterior communicating artery. 2-3 mm diameter outpouching of contrast medially along the posterior aspect horizontal segment cavernous right ICA. Additional tiny 1 mm diameter lateral outpouching of contrast from the posterior aspect horizontal portion left cavernous ICA. M1 SEGMENT MCA: No significant stenosis or aneurysm. PROXIMAL M2 SEGMENT MCA: No significant stenosis or aneurysm. A1 SEGMENT, ANTERIOR COMMUNICATING ARTERY AND PROXIMAL A2 SEGMENTS:           No significant stenosis or aneurysm. VERTEBRAL BASILAR SYSTEM:No significant stenosis or aneurysm. PCA:No significant stenosis or aneurysm. DISTAL ANTERIOR CEREBRAL ARTERY:No significant stenosis or aneurysm.       DISTAL MCA M2/M3 SEGMENT:No significant stenosis or aneurysm. DURAL VENOUS SINUSES: Unremarkable. BRAIN PARENCHYMA ON SOURCE DATA: No evidence of mass, mass effect or enhancing lesion with bilateral white matter hypodensity nonspecific. _______________     IMPRESSION: 1. No hemodynamically significant cervical vascular stenosis. 2. No evidence of intracranial large vessel occlusion or other significant cerebral artery stenosis. 3. Atherosclerotic changes carotid siphons with mild to moderate stenosis right supraclinoid ICA. 4. 2-3 mm diameter saccular aneurysm distal right supraclinoid ICA, intradural, with 2-3 mm diameter extradural saccular aneurysm horizontal right cavernous ICA, 1 mm diameter saccular aneurysm horizontal left cavernous ICA. Xr Pelv 1 Or 2 V    Result Date: 11/13/2019  EXAM: Portable pelvis CLINICAL INDICATION/HISTORY: found on ground   > Additional: None. COMPARISON: None. > Reference Exam: None. TECHNIQUE: Portable AP view. _______________ FINDINGS: No fracture or dislocation. Mild degenerative changes involving both hips. Bilateral tubal ligation clips. Lower lumbar spondylosis. _______________     IMPRESSION: No acute bony abnormality. Xr Chest Port    Result Date: 11/14/2019  --------------------------------------------------------------------------- <<<<<<<<<           [facility]           >>>>>>>>> --------------------------------------------------------------------------- CLINICAL HISTORY:  Sepsis. COMPARISON EXAMINATIONS:  May 13, 2019. ---  SINGLE FRONTAL VIEW OF THE CHEST  --- The cardiomediastinal silhouette is stable. The lung fields are hyperinflated. There is no focal consolidation or pleural effusion. No significant osseous abnormalities are identified.  --------------    Impression: -------------- Hyperinflation suggests COPD. No focal consolidation or pleural effusion. Xr Chest Port    Result Date: 11/13/2019  EXAM:  PORTABLE CHEST INDICATION:  Altered level consciousness.  Found on floor. TECHNIQUE:  Portable, erect AP view. COMPARISON:  10/25/2017 ____________________ FINDINGS:  SUPPORT DEVICES: None. HEART AND MEDIASTINUM: Cardiomediastinal silhouette appears within normal limits. Normal caliber thoracic aorta. LUNGS AND PLEURAL SPACES: Lungs are inflated with no confluent airspace opacity or pulmonary edema. No pleural effusion or pneumothorax. BONY THORAX AND SOFT TISSUES: No acute osseous abnormality. Degenerative change around the visualized shoulders. ____________________     IMPRESSION:  Hyperinflation without acute cardiopulmonary disease. Cta Chest Abd Pelv W Cont    Result Date: 11/19/2019  EXAM: CT angiogram of the chest, abdomen and pelvis INDICATION: History of aortic aneurysm, follow-up COMPARISON: None. Technique: CT arteriogram of the chest abdomen and pelvis was performed as a volume acquisition after intravenous contrast without complication. Coronal and sagittal  and 3D  reconstructions were performed in order to create angiographic images of the arterial vasculature and to increase accuracy for detection of vascular abnormality. One or more dose reduction techniques were used on this CT: automated exposure control, adjustment of the mAs and/or kVp according to patient size, and iterative reconstruction techniques. The specific techniques used on this CT exam have been documented in the patient's electronic medical record. Digital Imaging and Communications in Medicine (DICOM) format image data are available to nonaffiliated external healthcare facilities or entities on a secure, media free, reciprocally searchable basis with patient authorization for at least a 12-month period after this study. FINDINGS VASCULATURE: CHEST: Moderate fusiform aneurysm of the ascending thoracic aorta, measurements as below. No dissection or intramural hematoma. Minimal atherosclerotic changes. Visualized great vessels are widely patent.  Visualized pulmonary arteries opacify normally with contrast. Thoracic aortic measurements, 3-D lab generated: Sinus of Valsalva: 4.5 x 4.6 x 4 cm Sinotubular junction: 3.5 x 3.6 cm Ascending aorta: 4.6 x 4.5 cm Mid aortic arch: 3 x 3.1 cm Proximal descending aorta: 2.8 x 2.8 cm Mid descending thoracic aorta: 2.7 x 2.7 cm Distal descending thoracic aorta: 2.6 x 2.5 cm Abdomen/pelvis: Abdominal aorta is nonaneurysmal. Celiac artery, SMA, SHAMEKA and renal arteries are widely patent. Iliac arterial vasculature free of significant stenosis. NONVASCULAR FINDINGS: CHEST: Small bilateral pleural effusions. Minimal dependent atelectasis. . No focal airspace consolidation otherwise appreciated. MEDIASTINUM: Heart size normal. No pericardial effusion. No bulky adenopathy. LIVER, BILIARY: Liver is normal. No biliary dilation. Gallbladder is unremarkable. PANCREAS: Normal. SPLEEN: Normal. ADRENALS: Normal. KIDNEYS: Normal. LYMPH NODES: No enlarged lymph nodes. GASTROINTESTINAL TRACT: Abnormal appearance of the second/third portion of the duodenum as seen on axial image number [id number] with suggestion of wall thickening, without evidence of obstruction. [place] PELVIC ORGANS: 2 ovoid lesions are seen in the pelvis, largest in the right pelvis measuring 5 x 3.5 cm. . BONES: No acute or aggressive osseous abnormalities identified. OTHER: Small volume of ascites in the lower abdomen/pelvis and presacral edema noted. . _______________     IMPRESSION: Moderate fusiform aneurysm of the ascending thoracic aorta measuring up to 4.6 cm. No dissection or acute abnormality. No AAA. Small bilateral pleural effusions. Dependent minor atelectasis in both lung bases. Small nonspecific ascites and presacral edema present in the lower abdomen/pelvis. Question of abnormal wall thickening involving the mid/distal duodenum. Consider upper GI series and/or endoscopy for further evaluation to exclude underlying neoplasia or other infectious or inflammatory causes of wall thickening.  2 indeterminate ovoid masses in the lower pelvis. Patient appears to have had a hysterectomy. Solid ovarian lesions not excluded. Consider pelvic ultrasound for further evaluation. No results found for this or any previous visit. Please note that this dictation was completed with [employer], the computer voice recognition software. Quite often unanticipated grammatical, syntax, homophones, and other interpretive errors are inadvertently transcribed by the computer software. Please disregard these errors. Please excuse any errors that have escaped final proofreading.      CC: Evin Boyd MD

## 2019-11-21 NOTE — PROGRESS NOTES
Transition of care: [facility] snf today  Met with patient at bedside. States her sister is driving her to rehab    Cm has reiceved message from [facility] they have go authorization and the bria able to accept    Transition of care to SNF: [facility]     Met with patient and family, and they are agreeable to the transition plan. The Plan for Transition of Care is related to the following treatment goals: The Patient and/or patient representative sister Juan Chapman was provided with a choice of provider and agrees   with the discharge plan. Yes [x] No []    Freedom of choice list was provided with basic dialogue that supports the patient's individualized plan of care/goals and shares the quality data associated with the providers. Yes [x] No []    SNF/Rehab Transition:  Patient has been accepted to [address] and meets criteria for admission. Patient will transported by her sister Yari expected to leave at 1000    Communication to SNF/Rehab:  Bedside RN, , has been notified to update the transition plan to the facility and call report [phone]. Discharge information has been updated on the AVS and sent via [facility] and/or CC link. Discharge instructions to be fax'd to facility at [phone] per request     Please include all hard scripts for controlled substances, med rec and dc summary, and AVS in packet. Please medicate for pain prior to dc if possible and needed to help offset delay when patient first arrives to facility. Reviewed and confirmed with [facility], can manage the patient care needs for the following:     [employer] with (X) only those applicable:  Medication:  []Medications are available at the facility  []IV Antibiotics    []Controlled Substance  hard copies available sent.   []Weekly Labs    Equipment:  []CPAP/BiPAP  []Wound Vacuum  []Prabhakar or Urinary Device  []PICC/Central Line  []Nebulizer  []Ventilator    Treatment:  []Isolation (for MRSA, VRE, etc.)  []Surgical Drain Management  []Tracheostomy Care  []Dressing Changes  []Dialysis with transportation  []PEG Care  []Oxygen  []Daily Weights for Heart Failure    Dietary:  []Any diet limitations  []Tube Feedings   []Total Parenteral Management (TPN)    Financial Resources:  []Medicaid Application Completed    [x]UAI Completed and copy given to pt/family    []A screening has previously been completed. []Level II Completed    [] Private pay individual who will not become financially eligible for Medicaid within 6 months from admission to a [address] [place] [address] facility. [] Individual refused to have screening conducted. []Medicaid Application Completed    []The screening denied because it was determined individual did not need/did not qualify for nursing facility level of care. [] Out of state residents seeking direct admission to a [address] facility. [] Individuals who are inpatients of an out of state hospital, or in state or out of state veterans/ hospital and seek direct admission to a [address] facility  [] Individuals who are pateints or residents of a state owned/operated facility that is licensed by [employer] and seek direct admission to [address] [place] [address]  [] A screening not required for enrollment in [address] [address] [address] services as set out in [address] [place] [address] 30-  [] [facility]) staff shall perform screenings of the [facility] clients. Advanced Care Plan:  []Surrogate Decision Maker of Care  []POA  []Communicated Code Status and copy sent. Other:         Care Management Interventions  PCP Verified by CM:  Yes  Mode of Transport at Discharge: BLS  Transition of Care Consult (CM Consult): SNF  Partner SNF: Yes  Physical Therapy Consult: Yes  Occupational Therapy Consult: Yes  Current Support Network: Lives Alone, Family Lives Nearby  Confirm Follow Up Transport: Family  Plan discussed with Pt/Family/Caregiver: Yes  Freedom of Choice Offered: Yes  Discharge Location  Discharge Placement: Skilled nursing facility

## 2019-11-21 NOTE — PROGRESS NOTES
Problem: Mobility Impaired (Adult and Pediatric)  Goal: *Acute Goals and Plan of Care (Insert Text)  Description  Physical Therapy Goals   Initiated 11/15/2019 and to be accomplished within 3-5 day(s)  1. Patient will move from supine <> sit with Mod I in prep for out of bed activity and change of position. 2.  Patient will perform sit<> stand with S with LRAD in prep for transfers/ambulation. 3.  Patient will transfer from bed <> chair with S with LRAD for time up in chair for completion of ADL activity. 4.  Patient will ambulate >100 feet with LRAD/S for improved functional mobility/safe discharge. Outcome: Progressing Towards Goal   PHYSICAL THERAPY TREATMENT    Patient: Lanre Gant (28 y.o. female)  Date: 11/21/2019  Diagnosis: Altered mental status [R41.82]   Stroke (cerebrum) [facility])       Precautions: Fall   Chart, physical therapy assessment, plan of care and goals were reviewed. ASSESSMENT:  Pt found sitting at the EOB prior to session. Pt reported no pain. Pt continues to be confused during session and requires re-orienting. Pt able to increase in gait distance w/ RW/GB however continues to requires assistance w/ proper gait sequencing as pt has difficulty. Pt is very easily distracted and requires frequent VCs to stay on task. Pt transferred back to room where pt was able to perform dynamic therex activity and B/L LE therex. Pt left sitting at the EOB after session, family present in the room, nurse consulted after session. Progression toward goals:  [x]      Improving appropriately and progressing toward goals  []      Improving slowly and progressing toward goals  []      Not making progress toward goals and plan of care will be adjusted     PLAN:  Patient continues to benefit from skilled intervention to address the above impairments. Continue treatment per established plan of care.   Discharge Recommendations:  Rehab  Further Equipment Recommendations for Discharge:  rolling walker     SUBJECTIVE:   Patient stated I really am ready to go back to [place].     OBJECTIVE DATA SUMMARY:   Critical Behavior:  Neurologic State: Alert, Confused  Orientation Level: Oriented to person, Disoriented to situation, Disoriented to time, Disoriented to place  Cognition: Decreased command following, Decreased attention/concentration, Impaired decision making  Safety/Judgement: Decreased awareness of environment, Decreased insight into deficits  Functional Mobility Training:    Transfers:  Sit to Stand: Contact guard assistance  Stand to Sit: Contact guard assistance  Balance:  Sitting: Intact  Standing: Intact; With support  Standing - Static: Fair  Standing - Dynamic : Fair  Ambulation/Gait Training:  Distance (ft): 100 Feet (ft)  Assistive Device: Gait belt;Walker, rolling  Ambulation - Level of Assistance: Minimal assistance  Gait Abnormalities: Decreased step clearance  Base of Support: Narrowed  Speed/Kayla: Slow  Step Length: Left shortened;Right shortened  Swing Pattern: Left asymmetrical;Right asymmetrical  Therapeutic Exercises:       EXERCISE   Sets   Reps   Active Active Assist   Passive Self ROM   Comments   Ankle Pumps    [] [] [] []    Quad Sets/Glut Sets   [] [] [] []    Hamstring Sets   [] [] [] []    Short Arc Quads   [] [] [] []    Heel Slides   [] [] [] []    Straight Leg Raises   [] [] [] []    Hip Abd/Add 2 10 [x] [] [] []    Long Arc Quads   [] [] [] []    Seated Marching 2 10 [x] [] [] []    Standing Marching   [] [] [] []    Sit to Stand 1 5 [x] [] [] [] CGA      Pain:  Pain Scale 1: Numeric (0 - 10)  Pain Intensity 1: 0  Activity Tolerance:   Fair  Please refer to the flowsheet for vital signs taken during this treatment.   After treatment:   [] Patient left in no apparent distress sitting up in chair  [x] Patient left in no apparent distress in bed  [x] Call bell left within reach  [x] Nursing notified  [] Caregiver present  [] Bed alarm activated      Augusto Alvarez, PT Time Calculation: 29 mins

## 2019-11-21 NOTE — PROGRESS NOTES
Hospitalist Progress Note    Patient: Bety Fall MRN: [id number]  CSN: [id number]    YOB: 1950  Age: 77 y.o. Sex: female    DOA: 11/13/2019 LOS:  LOS: 7 days                Assessment/Plan     Patient Active Problem List   Diagnosis Code    Acute encephalopathy G93.40    Sinus bradycardia R00.1    EMILI (acute kidney injury) ([facility] [address] N17.9    Generalized weakness R53.1    Elevated troponin R79.89    Alcohol abuse F10.10    Hypothermia T68. Radha Gander    Stroke (cerebrum) ([facility]) I63.9    Ascending aortic aneurysm ([facility]) I71.2            78 y/o HTN, Hypothyroidism presented after being found down on the floor for undetermined amount of time. Awake, alert and answers to questions. Acute encephalopathy -   Metabolic along with possible alcohol withdrawal and EMILI  Back to her baseline. CVA -  MRI brain with several tiny acute small vessel infarcts right parieto-occipital white matter, subacute/chronic infarcts involving bilateral thalami. 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 Continue monitor, BP< 140/90 mm Hg. Seen by neurology, Recommend She should follow up with neurologist and neurointerventionist [facility] 2 months after discharge. Continue on aspirin    EMILI -   Resolved    Hypothermia -   Resolved, was on warming blanket. ETOH use -   On CIWA, started on thiamine, folic acid, multivitamin. HTN -   Her blood pressure on lower side, have stopped her home BP meds. Sinus mely with elevated troponin -   Seen by cardiology, no evidence of ACS  Echo with normal LVF, EF of 33-20%, grade 2 diastolic dysfunction. Mildly dilated ascending aorta. CTA of chest and abdomen with no AAA.  4.6cm ascending aortic aneurysm,    Duodenal wall thickening - EGD as outpatient. Hypothyroidism -   TSH elevated, low T3, continue with synthroid. PT/OT recommend SNF/Rehab        Disposition : 1-2 days    Review of systems  General: No fevers or chills. Cardiovascular: No chest pain or pressure. No palpitations. Pulmonary: No shortness of breath. Gastrointestinal: No nausea, vomiting. Physical Exam:  General: Awake, cooperative, no acute distress    HEENT: NC, Atraumatic. PERRLA, anicteric sclerae. Lungs: CTA Bilaterally. No Wheezing/Rhonchi/Rales. Heart:  S1 S2,  No murmur, No Rubs, No Gallops  Abdomen: Soft, Non distended, Non tender.  +Bowel sounds,   Extremities: No c/c/e  Psych:   Not anxious or agitated. Neurologic:  No acute neurological deficit. Vital signs/Intake and Output:  Visit Vitals  /81   Pulse 67   Temp 98.1 °F (36.7 °C)   Resp 13   Ht 5' 8\" (1.727 m)   Wt 68.1 kg (150 lb 2.1 oz)   SpO2 100%   BMI 22.83 kg/m²     Current Shift:  No intake/output data recorded. Last three shifts:  11/19 0701 - 11/20 1900  In: 1320 [P.O.:420; I.V.:900]  Out: -             Labs: Results:       Chemistry Recent Labs     11/19/19  0109   GLU 97      K 3.5      CO2 26   BUN 13   CREA 0.84   CA 8.6   AGAP 7   BUCR 15      CBC w/Diff No results for input(s): WBC, RBC, HGB, HCT, PLT, GRANS, LYMPH, EOS, HGBEXT, HCTEXT, PLTEXT, HGBEXT, HCTEXT, PLTEXT in the last 72 hours. Cardiac Enzymes No results for input(s): CPK, CKND1, CHRISTIAN in the last 72 hours. No lab exists for component: CKRMB, TROIP   Coagulation No results for input(s): PTP, INR, APTT, INREXT, INREXT in the last 72 hours.     Lipid Panel Lab Results   Component Value Date/Time    Cholesterol, total 169 11/16/2019 01:30 AM    HDL Cholesterol 48 11/16/2019 01:30 AM    LDL, calculated 93 11/16/2019 01:30 AM    VLDL, calculated 28 11/16/2019 01:30 AM    Triglyceride 140 11/16/2019 01:30 AM    CHOL/HDL Ratio 3.5 11/16/2019 01:30 AM      BNP No results for input(s): BNPP in the last 72 hours. Liver Enzymes No results for input(s): TP, ALB, TBIL, AP, SGOT, GPT in the last 72 hours.     No lab exists for component: DBIL   Thyroid Studies Lab Results   Component Value Date/Time    TSH 15.50 (H) 11/13/2019 12:10 PM        Procedures/imaging: see electronic medical records for all procedures/Xrays and details which were not copied into this note but were reviewed prior to creation of Plan

## 2019-11-21 NOTE — WOUND CARE
Pt assessed by wound care during quarterly skin prevalence. Pt has current deejay score of 19 , no pressure injuries observed at this time. Wound care will be available if needed during this hospitalization.

## 2019-11-21 NOTE — DISCHARGE INSTRUCTIONS
Patient Education        Stroke: Care Instructions  Your Care Instructions    You have had a stroke. This means that the blood flow to a part of your brain was blocked for some time, which damages the nerve cells in that part of the brain. The part of your body controlled by that part of your brain may not function properly now. The brain is an amazing organ that can heal itself to some degree. The stroke you had damaged part of your brain. But other parts of your brain may take over in some way for the damaged areas. You have already started this process. Your doctor will talk with you about what you can do to prevent another stroke. High blood pressure, high cholesterol, and diabetes are all risk factors for stroke. If you have any of these conditions, work with your doctor to make sure they are under control. Other risk factors for stroke include being overweight, smoking, and not getting regular exercise. Going home may be hard for you and your family. The more you can try to do for yourself, the better. Remember to take each day one at a time. Follow-up care is a key part of your treatment and safety. Be sure to make and go to all appointments, and call your doctor if you are having problems. It's also a good idea to know your test results and keep a list of the medicines you take. How can you care for yourself at home?    · Enter a stroke rehabilitation (rehab) program, if your doctor recommends it. Physical, speech, and occupational therapies can help you manage bathing, dressing, eating, and other basics of daily living.     · Do not drive until your doctor says it is okay.     · It is normal to feel sad or depressed after a stroke. If these feelings last, talk to your doctor.     · If you are having problems with urine leakage, go to the bathroom at regular times, including when you first wake up and at bedtime.  Also, limit fluids after dinner.     · If you are constipated, drink plenty of fluids, enough so that your urine is light yellow or clear like water. If you have kidney, heart, or liver disease and have to limit fluids, talk with your doctor before you increase the amount of fluids you drink. Set up a regular time for using the toilet. If you continue to have constipation, your doctor may suggest using a bulking agent, such as Metamucil, or a stool softener, laxative, or enema. Medicines    · Take your medicines exactly as prescribed. Call your doctor if you think you are having a problem with your medicine. You may be taking several medicines. ACE (angiotensin-converting enzyme) inhibitors, angiotensin II receptor blockers (ARBs), beta-blockers, diuretics (water pills), and calcium channel blockers control your blood pressure. Statins help lower cholesterol. Your doctor may also prescribe medicines for depression, pain, sleep problems, anxiety, or agitation.     · If your doctor has given you a blood thinner to prevent another stroke, be sure you get instructions about how to take your medicine safely. Blood thinners can cause serious bleeding problems.     · Do not take any over-the-counter medicines or herbal products without talking to your doctor first.     · If you take birth control pills or hormone therapy, talk to your doctor about whether they are right for you.    For family members and caregivers    · Make the home safe. Set up a room so that your loved one does not have to climb stairs. Be sure the bathroom is on the same floor. Move throw rugs and furniture that could cause falls. Make sure that the lighting is good. Put grab bars and seats in tubs and showers.     · Find out what your loved one can do and what he or she needs help with. Try not to do things for your loved one that your loved one can do on his or her own. Help him or her learn and practice new skills.     · Visit and talk with your loved one often.  Try doing activities together that you both enjoy, such as playing cards or board games. Keep in touch with your loved one's friends as much as you can. Encourage them to visit.     · Take care of yourself. Do not try to do everything yourself. Ask other family members to help. Eat well, get enough rest, and take time to do things that you enjoy. Keep up with your own doctor visits, and make sure to take your medicines regularly. Get out of the house as much as you can. Join a local support group. Find out if you qualify for home health care visits to help with rehab or for adult day care. When should you call for help? Call 911 anytime you think you may need emergency care. For example, call if:    · You have signs of another stroke. These may include:  ? Sudden numbness, tingling, weakness, or loss of movement in your face, arm, or leg, especially on only one side of your body. ? Sudden vision changes. ? Sudden trouble speaking. ? Sudden confusion or trouble understanding simple statements. ? Sudden problems with walking or balance. ? A sudden, severe headache that is different from past headaches. Call 911 even if these symptoms go away in a few minutes.    Call your doctor now or seek immediate medical care if:    · You have new symptoms that may be related to your stroke, such as falls or trouble swallowing.    Watch closely for changes in your health, and be sure to contact your doctor if you have any problems. Where can you learn more? Go to http://roxanna-tierra.info/. Enter O586 in the search box to learn more about \"Stroke: Care Instructions. \"  Current as of: September 26, 2018  Content Version: 12.2  © 9258-6990 [employer]. Care instructions adapted under license by [employer] (which disclaims liability or warranty for this information).  If you have questions about a medical condition or this instruction, always ask your healthcare professional. [address] any warranty or liability for your use of this information.

## 2019-11-21 NOTE — PROGRESS NOTES
0700 Assumed patient care from off-going nurse Freda Warren RN. Whiteboard updated, bed wheels locked, bed alarm set, and bed in lowest position. Patient was offered flu vaccine this am, Patient refused. 0800 Assessment complete. Patient resting comfortably on edge of bed. A&O x 1. No complaint of pain or SOB. Call bell within reach. Bed alarm set. Patient states she will be going to bed soon after her hamburger arrives from [place]. 1100 Patient discharged to SNF with family. Family is transportation for patient. Patient armband removed and shredded. [id number] [facility] for report.  Spoke with Kayla Shine

## 2019-11-21 NOTE — PROGRESS NOTES
Problem: Pressure Injury - Risk of  Goal: *Prevention of pressure injury  Description  Document [place] Scale and appropriate interventions in the flowsheet. Outcome: Progressing Towards Goal  Note: Pressure Injury Interventions:  Sensory Interventions: Assess changes in LOC, Discuss PT/OT consult with provider    Moisture Interventions: Absorbent underpads, Check for incontinence Q2 hours and as needed    Activity Interventions: Pressure redistribution bed/mattress(bed type), PT/OT evaluation    Mobility Interventions: Pressure redistribution bed/mattress (bed type), PT/OT evaluation    Nutrition Interventions: Document food/fluid/supplement intake    Friction and Shear Interventions: HOB 30 degrees or less                Problem: Patient Education: Go to Patient Education Activity  Goal: Patient/Family Education  Outcome: Progressing Towards Goal     Problem: Falls - Risk of  Goal: *Absence of Falls  Description  Document Gudelia Fall Risk and appropriate interventions in the flowsheet.   Outcome: Progressing Towards Goal  Note: Fall Risk Interventions:  Mobility Interventions: Patient to call before getting OOB    Mentation Interventions: Door open when patient unattended    Medication Interventions: Patient to call before getting OOB    Elimination Interventions: Call light in reach    History of Falls Interventions: Door open when patient unattended, Bed/chair exit alarm         Problem: Patient Education: Go to Patient Education Activity  Goal: Patient/Family Education  Outcome: Progressing Towards Goal     Problem: Patient Education: Go to Patient Education Activity  Goal: Patient/Family Education  Outcome: Progressing Towards Goal     Problem: Pain  Goal: *Control of Pain  Outcome: Progressing Towards Goal  Goal: *PALLIATIVE CARE:  Alleviation of Pain  Outcome: Progressing Towards Goal     Problem: TIA/CVA Stroke: Day 2 Until Discharge  Goal: Off Pathway (Use only if patient is Off Pathway)  Outcome: Progressing Towards Goal  Goal: Activity/Safety  Outcome: Progressing Towards Goal  Goal: Diagnostic Test/Procedures  Outcome: Progressing Towards Goal  Goal: Nutrition/Diet  Outcome: Progressing Towards Goal  Goal: Discharge Planning  Outcome: Progressing Towards Goal  Goal: Medications  Outcome: Progressing Towards Goal  Goal: Respiratory  Outcome: Progressing Towards Goal  Goal: Treatments/Interventions/Procedures  Outcome: Progressing Towards Goal  Goal: Psychosocial  Outcome: Progressing Towards Goal  Goal: *Verbalizes anxiety and depression are reduced or absent  Outcome: Progressing Towards Goal  Goal: *Absence of aspiration  Outcome: Progressing Towards Goal  Goal: *Absence of deep venous thrombosis signs and symptoms(Stroke Metric)  Outcome: Progressing Towards Goal  Goal: *Optimal pain control at patient's stated goal  Outcome: Progressing Towards Goal  Goal: *Tolerating diet  Outcome: Progressing Towards Goal  Goal: *Ability to perform ADLs and demonstrates progressive mobility and function  Outcome: Progressing Towards Goal  Goal: *Stroke education continued(Stroke Metric)  Outcome: Progressing Towards Goal     Problem: Ischemic Stroke: Discharge Outcomes  Goal: *Verbalizes anxiety and depression are reduced or absent  Outcome: Progressing Towards Goal  Goal: *Verbalize understanding of risk factor modification(Stroke Metric)  Outcome: Progressing Towards Goal  Goal: *Hemodynamically stable  Outcome: Progressing Towards Goal  Goal: *Absence of aspiration pneumonia  Outcome: Progressing Towards Goal  Goal: *Aware of needed dietary changes  Outcome: Progressing Towards Goal  Goal: *Verbalize understanding of prescribed medications including anti-coagulants, anti-lipid, and/or anti-platelets(Stroke Metric)  Outcome: Progressing Towards Goal  Goal: *Tolerating diet  Outcome: Progressing Towards Goal  Goal: *Aware of follow-up diagnostics related to anticoagulants  Outcome: Progressing Towards Goal  Goal: *Ability to perform ADLs and demonstrates progressive mobility and function  Outcome: Progressing Towards Goal  Goal: *Absence of DVT(Stroke Metric)  Outcome: Progressing Towards Goal  Goal: *Absence of aspiration  Outcome: Progressing Towards Goal  Goal: *Optimal pain control at patient's stated goal  Outcome: Progressing Towards Goal  Goal: *Home safety concerns addressed  Outcome: Progressing Towards Goal  Goal: *Describes available resources and support systems  Outcome: Progressing Towards Goal  Goal: *Verbalizes understanding of activation of EMS(911) for stroke symptoms(Stroke Metric)  Outcome: Progressing Towards Goal  Goal: *Understands and describes signs and symptoms to report to providers(Stroke Metric)  Outcome: Progressing Towards Goal  Goal: *Neurolgocially stable (absence of additional neurological deficits)  Outcome: Progressing Towards Goal  Goal: *Verbalizes importance of follow-up with primary care physician(Stroke Metric)  Outcome: Progressing Towards Goal  Goal: *Smoking cessation discussed,if applicable(Stroke Metric)  Outcome: Progressing Towards Goal  Goal: *Depression screening completed(Stroke Metric)  Outcome: Progressing Towards Goal

## 2019-12-03 NOTE — PROGRESS NOTES
December 3, 2019      [place] [facility]      Dear Patient:     The [facility] [address] [place] [address] requires that any individual seeking admission to a nursing facility, assisted living facility, or a home- and community-based waiver be evaluated to determine whether the individual requires that level of services, if the individual is financially Medicaid eligible, or expects to become Medicaid eligible within 180 days of the beginning date of services. If the individual is Medicaid eligible at the time of application or expects to become Medicaid eligible within 180 days of the beginning date of services, the screening must be completed. Medicaid contracts with several different agencies to perform pre-admission screening using the level-of-care criteria, assessment tool, and procedures established by Medicaid. The Pre-Admission Screening Team, in accordance with Medicaid policy and procedures, has determined that you do meet the criteria requirements for Medicaid-funded long-term care. This determination is based on the screening teams assessment of your functioning abilities, medical needs, and overall risk of needing institutional care. If you have been denied services, you may appeal this decision by writing to the Recipient Appeals Unit, [address] [place] [address], [address], [address], [address], of your desire to appeal within thirty (30) days of receipt of this decision letter.  You have the right to represent yourself or use , a relative, a friend, or other spokesperson in the appeal. If you choose to appeal, please include a copy of the letter with your appeal request.     Sincerely,     Killian Park, MSN, RN  [address]

## 2020-01-09 ENCOUNTER — HOSPITAL ENCOUNTER (OUTPATIENT)
Dept: LAB | Age: 70
Discharge: HOME | End: 2020-01-09
Payer: MEDICARE

## 2020-01-09 LAB
FOLATE SERPL-MCNC: 4.6 NG/ML (ref 3.1–17.5)
T4 FREE SERPL-MCNC: 0.6 NG/DL (ref 0.7–1.5)
TSH SERPL DL<=0.05 MIU/L-ACNC: 19.9 UIU/ML (ref 0.36–3.74)
VIT B12 SERPL-MCNC: 569 PG/ML (ref 211–911)

## 2020-01-09 PROCEDURE — 36415 COLL VENOUS BLD VENIPUNCTURE: CPT

## 2020-01-09 PROCEDURE — 82607 VITAMIN B-12: CPT

## 2020-01-09 PROCEDURE — 84443 ASSAY THYROID STIM HORMONE: CPT

## 2020-01-09 PROCEDURE — 84439 ASSAY OF FREE THYROXINE: CPT

## 2020-01-14 LAB
FAX TO INFO,FAXT: NORMAL
FAX TO NUMBER,FAXN: NORMAL

## 2021-10-25 NOTE — ROUTINE PROCESS
Bedside and Verbal shift change report given to Bambi Crespo RN (oncoming nurse) by Ferdinand Cline RN (offgoing nurse). Report included the following information SBAR and Kardex. Family Medicine Daily Progress Note    Subjective:  Pt is agitated sitting up in bed this morning. Pt states that she feels that she had moment of \"spacing out.\" Code stroke called on pt. See Rapid Response Note for details.     Hospital Meds  Current Facility-Administered Medications   Medication Dose Route Frequency Provider Last Rate Last Admin   • [START ON 10/26/2021] VANCOMYCIN TROUGH level due on 10/26/21 @ 5AM.   Does not apply See Admin Instructions Kahlil Deshpande MD       • HYDROcodone-acetaminophen (NORCO) 5-325 MG per tablet 1 tablet  1 tablet Oral Q6H PRN Mert Avendaño MD   1 tablet at 10/25/21 1209   • butalbital-APAP-caffeine (FIORICET) -40 MG tablet 1 tablet  1 tablet Oral Q4H While awake Teresita Abdi, DO   1 tablet at 10/25/21 1209   • ondansetron (ZOFRAN ODT) disintegrating tablet 4 mg  4 mg Oral Q12H PRN Claire Hoang PA-C        Or   • ondansetron (ZOFRAN) injection 4 mg  4 mg Intravenous Q12H PRN Claire Hoang PA-C       • potassium CHLORIDE (KLOR-CON M) lima ER tablet 20 mEq  20 mEq Oral Daily with breakfast Dima Wade MD   20 mEq at 10/25/21 0829   • vancomycin 1,750 mg in sodium chloride 0.9% 500 mL IVPB  1,750 mg Intravenous 2 times per day Kahlil Deshpande  mL/hr at 10/25/21 0522 1,750 mg at 10/25/21 0522   • cyclobenzaprine (FLEXERIL) tablet 5 mg  5 mg Oral TID PRN Tatianalourdes Joshi, DO   5 mg at 10/24/21 2035   • heparin (porcine) injection 5,000 Units  5,000 Units Subcutaneous 3 times per day Hitesh Pan MD   5,000 Units at 10/25/21 0518   • sodium chloride (PF) 0.9 % injection 10 mL  10 mL Injection PRN Hitesh Pan MD   10 mL at 10/20/21 2038   • sodium chloride (PF) 0.9 % injection 10 mL  10 mL Injection 2 times per day Hitesh Pan MD   10 mL at 10/25/21 0838   • sodium chloride (PF) 0.9 % injection 20 mL  20 mL Injection PRN Hitesh Pan MD       • labetalol (NORMODYNE) injection 5 mg  5 mg Intravenous Q10 Min PRN Eugenie  IGLESIA Mullen MD       • VANCOMYCIN - PHARMACIST MONITORED Misc   Does not apply See Admin Instructions Kahlil Deshpande MD       • metroNIDAZOLE (FLAGYL) IVPB 500 mg  500 mg Intravenous 4 times per day Kahlil Deshpande  mL/hr at 10/25/21 1310 500 mg at 10/25/21 1310   • docusate sodium (COLACE) capsule 200 mg  200 mg Oral BID Linsey Lewis CNP   200 mg at 10/23/21 2013    Or   • docusate sodium (COLACE) 50 MG/5ML liquid 100 mg  100 mg Per NG tube BID Linsey Lewis CNP       • Potassium Standard Replacement Protocol   Does not apply See Admin Instructions Linsey Lewis CNP       • Magnesium Standard Replacement Protocol   Does not apply See Admin Instructions Linsey Lewis CNP       • Phosphorus Standard Replacement Protocol   Does not apply See Admin Instructions Linsey Lewis CNP       • sodium chloride 0.9 % flush bag 25 mL  25 mL Intravenous PRN Claire Hoang PA-C       • sodium chloride (PF) 0.9 % injection 2 mL  2 mL Intracatheter 2 times per day Claire Hoang PA-C   2 mL at 10/24/21 2035   • lamoTRIgine (LaMICtal) tablet 150 mg  150 mg Oral BID Claire Hoang PA-C   150 mg at 10/25/21 0828   • pravastatin (PRAVACHOL) tablet 80 mg  80 mg Oral Daily Claire Hoang PA-C   80 mg at 10/25/21 0828   • levETIRAcetam (KepPRA) tablet 2,000 mg  2,000 mg Oral TID Claire Hoang PA-C   2,000 mg at 10/25/21 0828   • pantoprazole (PROTONIX) EC tablet 40 mg  40 mg Oral QAM AC Claire Hoang PA-C   40 mg at 10/25/21 0518   • carBAMazepine (TEGretol) tablet 400 mg  400 mg Oral BID Colleen Metropulos   400 mg at 10/25/21 0829   • cefTRIAXone (ROCEPHIN) syringe 2,000 mg  2,000 mg Intravenous 2 times per day Kahlil Deshpande MD   2,000 mg at 10/25/21 0828          Objective:    I/O's    Intake/Output Summary (Last 24 hours) at 10/25/2021 1446  Last data filed at 10/24/2021 2000  Gross per 24 hour   Intake --   Output 550 ml   Net -550 ml     Vitals  Visit Vitals  /59   Pulse 93   Temp 98.4  °F (36.9 °C) (Oral)   Resp (!) 11   Ht 5' 5\" (1.651 m)   Wt 90.3 kg (199 lb 1.2 oz)   SpO2 99%   BMI 33.13 kg/m²     Physical Exam     Constitutional:       General: She is not in acute distress.     Appearance: Normal appearance.   HENT:      Head: Normocephalic.   Cardiovascular:      Rate and Rhythm: Normal rate and regular rhythm.   Pulmonary:      Effort: Pulmonary effort is normal.      Breath sounds: Normal breath sounds.   Abdominal:      General: Bowel sounds are normal.      Palpations: Abdomen is soft.      Tenderness: There is no abdominal tenderness.   Musculoskeletal:         General: No swelling.      Cervical back: Neck supple.   Skin:     General: Skin is warm.      Findings: No rash.   Neurological:      General: No focal deficit present.      Mental Status: She is alert and oriented to person, place, and time. Positive for speech difficulty, weakness and headaches.     Comments: Right arm and leg 3/5, L leg 1/5   Psychiatric:         Mood and Affect: Mood normal.     Labs     Recent Results (from the past 24 hour(s))   Comprehensive Metabolic Panel    Collection Time: 10/25/21  5:04 AM   Result Value Ref Range    Fasting Status      Sodium 135 135 - 145 mmol/L    Potassium 4.0 3.4 - 5.1 mmol/L    Chloride 104 98 - 107 mmol/L    Carbon Dioxide 25 21 - 32 mmol/L    Anion Gap 10 10 - 20 mmol/L    Glucose 105 (H) 70 - 99 mg/dL    BUN 8 6 - 20 mg/dL    Creatinine 0.59 0.51 - 0.95 mg/dL    Glomerular Filtration Rate >90 >=60    BUN/ Creatinine Ratio 14 7 - 25    Calcium 9.1 8.4 - 10.2 mg/dL    Bilirubin, Total 0.4 0.2 - 1.0 mg/dL    GOT/AST 42 (H) <=37 Units/L    GPT/ALT 40 <64 Units/L    Alkaline Phosphatase 291 (H) 45 - 117 Units/L    Albumin 3.2 (L) 3.6 - 5.1 g/dL    Protein, Total 7.1 6.4 - 8.2 g/dL    Globulin 3.9 2.0 - 4.0 g/dL    A/G Ratio 0.8 (L) 1.0 - 2.4   CBC with Automated Differential (performable only)    Collection Time: 10/25/21  5:04 AM   Result Value Ref Range    WBC 6.9 4.2 - 11.0  K/mcL    RBC 4.31 4.00 - 5.20 mil/mcL    HGB 12.6 12.0 - 15.5 g/dL    HCT 39.2 36.0 - 46.5 %    MCV 91.0 78.0 - 100.0 fl    MCH 29.2 26.0 - 34.0 pg    MCHC 32.1 32.0 - 36.5 g/dL    RDW-CV 13.1 11.0 - 15.0 %    RDW-SD 43.4 39.0 - 50.0 fL     140 - 450 K/mcL    NRBC 0 <=0 /100 WBC    Neutrophil, Percent 77 %    Lymphocytes, Percent 12 %    Mono, Percent 7 %    Eosinophils, Percent 3 %    Basophils, Percent 0 %    Immature Granulocytes 1 %    Absolute Neutrophils 5.3 1.8 - 7.7 K/mcL    Absolute Lymphocytes 0.8 (L) 1.0 - 4.0 K/mcL    Absolute Monocytes 0.5 0.3 - 0.9 K/mcL    Absolute Eosinophils  0.2 0.0 - 0.5 K/mcL    Absolute Basophils 0.0 0.0 - 0.3 K/mcL    Absolute Immmature Granulocytes 0.1 0.0 - 0.2 K/mcL   GLUCOSE, BEDSIDE - POINT OF CARE    Collection Time: 10/25/21  8:44 AM   Result Value Ref Range    GLUCOSE, BEDSIDE - POINT OF CARE 109 (H) 70 - 99 mg/dL   BLOOD GAS, ARTERIAL WITH COOXIMETRY - RESPIRATORY    Collection Time: 10/25/21  9:06 AM   Result Value Ref Range    BASE EXCESS / DEFICIT, ARTERIAL - RESPIRATORY -1 -2 - 3 mmol/L    HCO3, ARTERIAL - RESPIRATORY 21 (L) 22 - 28 mmol/L    O2 CONTENT, ARTERIAL - RESPIRATORY 18 15 - 23 %    PCO2, ARTERIAL - RESPIRATORY 27 (L) 32 - 45 mm Hg    PH, ARTERIAL - RESPIRATORY 7.49 (H) 7.35 - 7.45 Units    PO2, ARTERIAL - RESPIRATORY 75 (L) 83 - 108 mm Hg    O2 SATURATION, ARTERIAL - RESPIRATORY 97 95 - 99 %    CONDITION - RESPIRATORY ;6L NC     CARBOXYHEMOGLOBIN - RESPIRATORY 2.0 1.5 - 15.0 %    HEMOGLOBIN - RESPIRATORY 13.7 12.0 - 15.5 g/dL    METHEMOGLOBIN - RESPIRATORY 0.9 <=1.6 %    OXYHEMOGLOBIN, ARTERIAL - RESPIRATORY 94.4 94.0 - 98.0 %    SITE - RESPIRATORY Right Radial     TEMPERATURE - RESPIRATORY 37.0 degrees   POTASSIUM - RESPIRATORY    Collection Time: 10/25/21  9:06 AM   Result Value Ref Range    POTASSIUM - RESPIRATORY 3.7 3.4 - 5.1 mmol/L   SODIUM - RESPIRATORY    Collection Time: 10/25/21  9:06 AM   Result Value Ref Range    SODIUM - RESPIRATORY  135 135 - 145 mmol/L   CALCIUM, IONIZED - RESPIRATORY    Collection Time: 10/25/21  9:06 AM   Result Value Ref Range    CALCIUM, IONIZED - RESPIRATORY 1.18 1.15 - 1.29 mmol/L   LACTIC ACID, ARTERIAL - RESPIRATORY    Collection Time: 10/25/21  9:06 AM   Result Value Ref Range    LACTIC ACID, ARTERIAL - RESPIRATORY 1.3 <1.6 mmol/L   Electrocardiogram 12-Lead    Collection Time: 10/25/21  9:11 AM   Result Value Ref Range    Ventricular Rate EKG/Min (BPM) 106     Atrial Rate (BPM) 106     TN-Interval (MSEC) 126     QRS-Interval (MSEC) 84     QT-Interval (MSEC) 358     QTc 475     P Axis (Degrees) 36     R Axis (Degrees) -4     T Axis (Degrees) 38     REPORT TEXT       Sinus tachycardia  Inferior infarct  (cited on or before  17-OCT-2021)  Abnormal ECG  When compared with ECG of  17-OCT-2021 20:42,  T wave inversion no longer evident in  Anterior leads       Imaging  CTA HEAD AND NECK W CONTRAST LEVEL 1  Narrative: EXAM:  CTA HEAD AND NECK W CONTRAST LEVEL 1    EXAMINATION:  1. Computed tomographic angiography (CTA) of the head without and with  contrast  2. CTA of the neck with contrast    Comparison: 10/16/2021.    HISTORY: Stroke suspected (Ped 0-18y) . Recent evacuation of an abscess in  the left parietal lobe. Prior embolization and clipping of an arteriovenous  lesion along the tentorium with encephalomalacia and gliosis in the  adjacent left occipital lobe.      TECHNIQUE: CT of the head was performed without contrast according to  standard protocol. Then CT angiography of the head and neck was obtained  after the uneventful administration of 67 mL Omnipaque 350 intravenous  contrast. Three dimensional postprocessing was performed by the  technologist and sent to the workstation for review.    FINDINGS:    Non-angiographic findings:   Postsurgical sequela of recent left parietal craniotomy is noted with  subjacent extra-axial gas and fluid.  This may represent postsurgical  change.  Superimposed extra-axial  infection/subdural empyema cannot be  excluded.  There is a 1.4 x 3.6 cm gas/fluid collection in the left  parietal lobe.  Appearance may represent postsurgical change or may  represent residual abscess.  There is moderate surrounding vasogenic edema  in the left parietal lobe extending into the left occipital and posterior  left temporal lobe.  Postsurgical sequela of prior endovascular and  surgical treatment of left parietal arteriovenous malformation also noted.    Incidentally noted are calcifications in the left hilar region and the  posterior right upper lobe.  This is incompletely evaluated.    Angiographic findings:    The visualized aortic arch appears normal. The configuration of the  brachiocephalic vessels is typical. The innominate artery and both  subclavian arteries appear normal. The common carotid arteries appear  normal. The carotid bifurcations appear normal. The cervical internal  carotid arteries appear normal. The cervical vertebral arteries appear  normal.    The distal internal carotid arteries appear normal. The anterior and middle  cerebral arteries appear normal. The distal vertebral arteries appear  normal. The basilar artery and posterior cerebral arteries appear patent.   Mild irregularity of the P2 segment of the left posterior cerebral artery  with adjacent edema.  No evidence of vascular occlusion.  Metallic  artifacts limits evaluation in the left parieto-occipital region.  No  definite evidence of recurrent high flow vascular malformation.   Postsurgical changes noted.    No aneurysms, vascular occlusions, or intracranial stenoses are identified.  Impression: 1. Postsurgical sequela of recent left parietal craniotomy is noted with  subjacent extra-axial gas and fluid.  This may represent postsurgical  change.  Superimposed extra-axial infection/subdural empyema cannot be  excluded.     2. .  Mild irregularity of the P2 segment of the left posterior cerebral  artery with adjacent  edema.  No evidence of vascular occlusion.  No  aneurysm Metallic artifacts limits evaluation in the left parieto-occipital  region.  No definite evidence of recurrent high flow vascular malformation.    Electronically Signed by: LACI ANDREWS MD   Signed on: 10/25/2021 12:39 PM   CTA CHEST PULMONARY EMBOLISM W CONTRAST  Narrative: EXAM: CTA CHEST PULMONARY EMBOLISM W CONTRAST    HISTORY:  Septic arterial embolism  septic emboli    COMPARISON:  10/19/2021    TECHNIQUE:  CT angiogram of the chest was performed with IV contrast as per pulmonary  embolus protocol.  3-D Maximum Intensity Projections were performed on an  independent workstation with concurrent supervision of a radiologist.  Multiplanar reformats were performed and evaluated.      Contrast: Patient was administered 52 ml of Omnipaque 350without immediate  complications.    FINDINGS:      The evaluation of the pulmonary arteries is slightly limited due to  respiratory motion.  The main pulmonary arteries are patent.  The segmental  levels are slightly limited more so within the right lower lobe branches.       Borderline enlarged left hilar lymph node is present.  The heart size is  within normal limits.  No pericardial effusion.  No pleural effusions are  present.      Embolization coils are seen within the left upper lobe and right lower lobe  superior segment.  The opacity along the left upper lobe embolization coils  with tethering of the adjacent fissure is consistent with volume loss  unchanged from prior.  No new consolidation is present.  Airways patent.  A  few less than 5 mm nodular densities are seen within the right middle lobe  which are unchanged.  Less than 5 mm nodular density is seen within the  right upper lobe posteriorly near the fissure fissure new from the previous  exam.  Sequela of inflammatory or infectious process are considerations.  Impression: 1.    The evaluation of the pulmonary arteries is slightly limited due  to  respiratory motion.  No gross filling defect is seen within the central  pulmonary arteries.  The segmental levels are limited due to respiratory  motion.  2.    No gross evidence for arterial venous malformation within the limits  of the examination.  Embolization coils within the upper lungs is  consistent with prior AVM treatment.    3.    A few less than 5 mm lung nodules are seen within the right middle  lobe and right upper lobe posteriorly.  Attention can be paid to these  areas on future exam.    Electronically Signed by: KANDACE ARIAS D.O.   Signed on: 10/25/2021 12:22 PM   XR CHEST AP OR PA 1 VIEW  Narrative: EXAMINATION:  XR CHEST PA OR AP 1 VIEW.     INDICATION: Shortness of breath.  Impression: FINDINGS/IMPRESSION:    Since the examination of 10/19/2021, there has been apparent slight  regression of the previously observed accentuation of the bilateral  perihilar bronchovascular markings.    There is a peripheral right-sided central venous catheter.  There are  degenerative changes of the thoracic spine.  The heart size is normal.   There is slight tortuosity of the thoracic aorta.    There is no evidence of infiltrate or pneumothorax.    Electronically Signed by: SURYA COHEN MD   Signed on: 10/25/2021 12:01 PM   CT HEAD LEVEL 1  Narrative: EXAMINATION: Computed tomography (CT) of the head without contrast    HISTORY: Stroke Alert. Left parietal lobe abscess status post evacuation.    TECHNIQUE: CT of the head was performed without contrast according to  standard protocol.    COMPARISON: CT head from 10/20/2021.    FINDINGS:    There has been a recent left parietal craniotomy for evacuation of an  intracerebral abscess involving the posteromedial left parietal lobe. There  is unchanged thin extra-axial fluid and gas subjacent to the craniotomy.  There is small fluid and gas locule within the abscess cavity which appears  similar to the prior study. There is surrounding vasogenic edema in the  left  parietal lobe extending to the occipital lobe which appears relatively  unchanged since the prior study from five days earlier. The adjacent  superior sagittal sinus appears normal in density. There has been prior  treatment of an arteriovenous malformation along the left tentorium with  endovascular coil embolization as well as surgical clipping. There is  unchanged encephalomalacia and gliosis in the adjacent left occipital lobe.  The ventricles appear nondilated. The basilar cisterns appear patent. There  is atherosclerosis involving the carotid siphons. The imaged portions of  the orbits, paranasal sinuses, and mastoids appear normal. No acute  fracture is seen.  Impression: 1. No acute intracranial hemorrhage. No new abnormality.    2. Recent evacuation of an abscess in the left parietal lobe. Unchanged  small fluid and gas locule within the evacuation cavity. Unchanged  surrounding vasogenic edema in the left parietal and occipital lobes. The  lack of regression of reactive vasogenic edema one week following abscess  evacuation is unusual. Continued short-term imaging follow-up is  recommended to ensure reduction or resolution of edema. Prior postoperative  MRI from one week ago show no evidence of residual abscess. If there is  clinical concern for recurrent abscess, MRI brain without and with contrast  may be obtained.    3. Prior embolization and clipping of an arteriovenous lesion along the  tentorium with encephalomalacia and gliosis in the adjacent left occipital  lobe.    Findings were conveyed to Jeni Gonzalez NP via telephone at 9:36 AM on  10/25/2021 by Dr. Gomez.    Electronically Signed by: ANNALISA GOMEZ MD   Signed on: 10/25/2021 9:45 AM       Cultures  Microbiology Results     None         Assessment/Plan:    Brittany Ring is 56 yo F with pmhx hereditary hemorrhagic telangiectasia, epilepsy (complex partial seizures), migraines w/ aura, HLD, colon cancer s/p resection in 2019 and AVM s/p  craniotomy (x2) and embolization w/ residual language deficit who presents with c/o right sided weakness and aphasia.     Right sided weakness  - code stroke called on pt - see rapid response note for details  - Pt transferred to C[facility]  -patient reporting right sided weakness, physical exam notable for weakness in RUE/RLE, impaired coordination, decreased sensation on the right  -code 30 called on admission, NIHSS 6  -CT head: edema in left parietal and occipital lobes, vasogenic edema pattern with suspected underlying lesion. 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  -Neurosurgery on consult, Dr. Skaggs             -craniotomy, drainage of abscess : OR 10/17  -ID consulted, Dr. Deshpande             - cont IV CTX, flagyl, vancomycin - high dose             - F/u blood cxs, OR cxs and sensitivities of s. Intermedius,  pathology     AVM (arteriovenous malformation) brain, s/p craniotomy and embolization  -hx of AVM, s/p craniotomy 1991, 2004  -patient with reported residual VFD, reported loss of vision in right upper field, unchanged and reported language deficit since surgeries  -Neurosurgeon is Dr. Brar  -stable     Right to left shunt  - f/u repeat echocardiogram with bubbles.    - Consider pulmonary angiogram or CT PE     Epilepsy, complex partial (CMS/HCC)  -follows outpatient with Dr. Mckeon, last visit 08/2021  -last reported seizure July 2021  -continue home Keppra 2000mg BID, carbamazepine 400mg BID, lamictal 150 mg BID  - 24 hr EEG negative for seizures  - seizures precautions  - CTM neuro exams, repeat CT with any changes     Migraines  -follows outpatient with Dr. Mckeon, last visit 08/2021  -takes Fioricet at home as needed for migraines  - (s10/23) Fioricet scheduled + Norco  5-325 PRN     History of colon cancer  -diagnosed 2019, stage 1 s/p resection  -follows w/ Dr. Burroughs, Gastroenterology  -last colonoscopy 8/31/2021     F: SLIV  E: replete as needed  N:  Regular diet  DVT Ppx: SCD, SQH  GI Ppx: protonix  Dispo: pending neuro recs    Code Status    Code Status: Full Resuscitation    Plan of care was discussed with attending physician, Dr. Adriana Méndez MD  PGY-1, [facility] Medicine  [place] [id number]